# Patient Record
Sex: FEMALE | Race: OTHER | HISPANIC OR LATINO
[De-identification: names, ages, dates, MRNs, and addresses within clinical notes are randomized per-mention and may not be internally consistent; named-entity substitution may affect disease eponyms.]

---

## 2021-12-28 ENCOUNTER — RESULT REVIEW (OUTPATIENT)
Age: 86
End: 2021-12-28

## 2021-12-28 ENCOUNTER — APPOINTMENT (OUTPATIENT)
Dept: NEPHROLOGY | Facility: CLINIC | Age: 86
End: 2021-12-28
Payer: MEDICARE

## 2021-12-28 VITALS
SYSTOLIC BLOOD PRESSURE: 137 MMHG | HEART RATE: 44 BPM | DIASTOLIC BLOOD PRESSURE: 77 MMHG | RESPIRATION RATE: 16 BRPM | OXYGEN SATURATION: 99 %

## 2021-12-28 DIAGNOSIS — Z78.9 OTHER SPECIFIED HEALTH STATUS: ICD-10-CM

## 2021-12-28 DIAGNOSIS — Z85.528 PERSONAL HISTORY OF OTHER MALIGNANT NEOPLASM OF KIDNEY: ICD-10-CM

## 2021-12-28 DIAGNOSIS — Z84.1 FAMILY HISTORY OF DISORDERS OF KIDNEY AND URETER: ICD-10-CM

## 2021-12-28 DIAGNOSIS — Z90.5 ACQUIRED ABSENCE OF KIDNEY: ICD-10-CM

## 2021-12-28 DIAGNOSIS — I10 ESSENTIAL (PRIMARY) HYPERTENSION: ICD-10-CM

## 2021-12-28 DIAGNOSIS — N18.31 CHRONIC KIDNEY DISEASE, STAGE 3A: ICD-10-CM

## 2021-12-28 DIAGNOSIS — R73.03 PREDIABETES.: ICD-10-CM

## 2021-12-28 PROCEDURE — 99204 OFFICE O/P NEW MOD 45 MIN: CPT

## 2021-12-29 ENCOUNTER — OUTPATIENT (OUTPATIENT)
Dept: OUTPATIENT SERVICES | Facility: HOSPITAL | Age: 86
LOS: 1 days | End: 2021-12-29
Payer: MEDICARE

## 2021-12-29 ENCOUNTER — APPOINTMENT (OUTPATIENT)
Dept: ULTRASOUND IMAGING | Facility: HOSPITAL | Age: 86
End: 2021-12-29
Payer: MEDICARE

## 2021-12-29 PROCEDURE — 76770 US EXAM ABDO BACK WALL COMP: CPT

## 2021-12-29 PROCEDURE — 76770 US EXAM ABDO BACK WALL COMP: CPT | Mod: 26

## 2022-01-03 LAB
25(OH)D3 SERPL-MCNC: 26.4 NG/ML
ALBUMIN SERPL ELPH-MCNC: 4.3 G/DL
ANION GAP SERPL CALC-SCNC: 10 MMOL/L
APPEARANCE: CLEAR
BACTERIA: NEGATIVE
BILIRUBIN URINE: NEGATIVE
BLOOD URINE: NEGATIVE
BUN SERPL-MCNC: 22 MG/DL
CALCIUM SERPL-MCNC: 10.1 MG/DL
CALCIUM SERPL-MCNC: 10.1 MG/DL
CHLORIDE SERPL-SCNC: 104 MMOL/L
CO2 SERPL-SCNC: 30 MMOL/L
COLOR: NORMAL
CREAT SERPL-MCNC: 1.12 MG/DL
CREAT SPEC-SCNC: 56 MG/DL
CREAT SPEC-SCNC: 56 MG/DL
CREAT/PROT UR: 0.1 RATIO
CYSTATIN C SERPL-MCNC: 1.27 MG/L
ESTIMATED AVERAGE GLUCOSE: 128 MG/DL
GFR/BSA.PRED SERPLBLD CYS-BASED-ARV: 48 ML/MIN
GLUCOSE QUALITATIVE U: NEGATIVE
GLUCOSE SERPL-MCNC: 83 MG/DL
HBA1C MFR BLD HPLC: 6.1 %
HYALINE CASTS: 0 /LPF
KETONES URINE: NEGATIVE
LEUKOCYTE ESTERASE URINE: NEGATIVE
MAGNESIUM SERPL-MCNC: 2.1 MG/DL
MICROALBUMIN 24H UR DL<=1MG/L-MCNC: <1.2 MG/DL
MICROALBUMIN/CREAT 24H UR-RTO: NORMAL MG/G
MICROSCOPIC-UA: NORMAL
NITRITE URINE: NEGATIVE
PARATHYROID HORMONE INTACT: 101 PG/ML
PH URINE: 7
PHOSPHATE SERPL-MCNC: 3.6 MG/DL
POTASSIUM SERPL-SCNC: 5.3 MMOL/L
PROT UR-MCNC: 5 MG/DL
PROTEIN URINE: NEGATIVE
RED BLOOD CELLS URINE: 1 /HPF
SODIUM SERPL-SCNC: 144 MMOL/L
SPECIFIC GRAVITY URINE: 1.01
SQUAMOUS EPITHELIAL CELLS: 0 /HPF
UROBILINOGEN URINE: NORMAL
WHITE BLOOD CELLS URINE: 0 /HPF

## 2022-01-03 NOTE — HISTORY OF PRESENT ILLNESS
[FreeTextEntry1] : 87yo Stateless speaking female with longstanding HTN, solitary kidney s/p nephrectomy '98 for cancer referred for rising serum creatinine \par \par Dr Adebayo Rehman \par \par Accompanied by daughter\par \par She generally feels quite well. Blood pressure is typically well controlled. Was told of "mild kidney problems" a few years ago but told in the summer by her PCP that her eGFR dropped to high 40's. \par She denies NSAID use. Denies urinary hesitancy, hematuria or foamy urine. \par Unfortunately she didn't bring her med list, takes statin and 50mg metoprolol once daily, she thinks shes on a water pill (likely HCTZ), unsure of Losartan. \par No dizziness, headaches, weight loss. No recent changes to medications. \par \par OTC: baby ASA, Mg - will stop, uses for sleep. \par \par Fam Hx: several siblings with CKD

## 2022-01-03 NOTE — CONSULT LETTER
[Dear  ___] : Dear  [unfilled], [Consult Letter:] : I had the pleasure of evaluating your patient, [unfilled]. [Please see my note below.] : Please see my note below. [Consult Closing:] : Thank you very much for allowing me to participate in the care of this patient.  If you have any questions, please do not hesitate to contact me. [Sincerely,] : Sincerely, [FreeTextEntry3] : Shobha Burnett MD\par  of Medicine\par Division of Kidney Diseases and Hypertension\par [facility] \par [employer] [facility] Tel: [phone]\par Email: [email or web address] \par

## 2022-01-03 NOTE — PHYSICAL EXAM
[General Appearance - Alert] : alert [General Appearance - In No Acute Distress] : in no acute distress [Jugular Venous Distention Increased] : there was no jugular-venous distention [Respiration, Rhythm And Depth] : normal respiratory rhythm and effort [Auscultation Breath Sounds / Voice Sounds] : lungs were clear to auscultation bilaterally [Heart Sounds] : normal S1 and S2 [Edema] : there was no peripheral edema [Urinary Bladder Findings] : the bladder was normal on palpation [No CVA Tenderness] : no ~M costovertebral angle tenderness [Musculoskeletal - Swelling] : no joint swelling seen [] : no rash [No Focal Deficits] : no focal deficits [Oriented To Time, Place, And Person] : oriented to person, place, and time [FreeTextEntry1] : bradycardic

## 2022-01-03 NOTE — ASSESSMENT
[FreeTextEntry1] : 87yo Danish speaking female with longstanding HTN, solitary kidney s/p nephrectomy '98 for cancer referred for rising serum creatinine \par \par # CKD IIIa with solitary kidney\par - reviewed 2021 and 2020 labs with patient and daughter. Reassured that renal function is actually quite good considering her age and solitary kidney for >20yrs. She is unlikely to progress to ESRD. u/a bland. \par July 2021 Cr 1.07 egfr 47-55ml/min K 5 bic 31 A1C 5.8 normal albumin and calcium, no anemia\par 2020 Cr 0.97 u/a 1+ protein, repeat bland \par - discussed risk factors of CKD progression such as improving her preDM, maintaining BP control, avoiding NSAIDs. She was following strict no animal protein, no dairy diet because of her CKD, she can lift these restrictions and focus on low calorie, low sugar and low Na diet. \par - HTN well controlled, she will call back to confirm her medications \par \par Plan\par - check repeat u/a today with urine PCR And ACR, renal function including cystatin C based eGFR for better estimation of underlying renal function given her age and lower muscle mass\par - check Mg given she takes supplements but is also on HCTZ\par - screen for secondary hyperparathyroidism/CKD-MBD\par - check renal and bladder US with PVR\par - f/u in 6 months\par \par 1/3/22 addendum: d/w pt and daughter, stable creatinine with cystatin C based eGFR 44ml/min, bland u/a and echogenic R kidney all consistent with solitary kidney and her age. PTH mildly elevated however calcium wnl. Confirmed meds, metoprolol 25mg daily, HCTZ 25mg daily, rosuvastatin 20mg.

## 2022-01-20 ENCOUNTER — APPOINTMENT (OUTPATIENT)
Dept: HEART AND VASCULAR | Facility: CLINIC | Age: 87
End: 2022-01-20

## 2022-01-31 ENCOUNTER — APPOINTMENT (OUTPATIENT)
Dept: HEART AND VASCULAR | Facility: CLINIC | Age: 87
End: 2022-01-31
Payer: MEDICARE

## 2022-01-31 VITALS
SYSTOLIC BLOOD PRESSURE: 112 MMHG | DIASTOLIC BLOOD PRESSURE: 72 MMHG | HEART RATE: 51 BPM | WEIGHT: 160 LBS | BODY MASS INDEX: 29.44 KG/M2 | OXYGEN SATURATION: 96 % | HEIGHT: 62 IN | TEMPERATURE: 97.8 F

## 2022-01-31 DIAGNOSIS — R00.1 BRADYCARDIA, UNSPECIFIED: ICD-10-CM

## 2022-01-31 DIAGNOSIS — R53.83 OTHER FATIGUE: ICD-10-CM

## 2022-01-31 PROCEDURE — 36415 COLL VENOUS BLD VENIPUNCTURE: CPT

## 2022-01-31 PROCEDURE — 99205 OFFICE O/P NEW HI 60 MIN: CPT | Mod: 25

## 2022-01-31 PROCEDURE — 93000 ELECTROCARDIOGRAM COMPLETE: CPT

## 2022-01-31 NOTE — DISCUSSION/SUMMARY
[Patient] : the patient [___ Month(s)] : in [unfilled] month(s) [FreeTextEntry1] : 85 y/o female with h/o asthma, htn, predm, solitary kidney s/p nephrectomy 1998 for renal cell cancer who presents for initial evaluation today\par \par -labs 2021, 2022 reviewed - repeat labs today for fatigue\par -continue hctz\par -dc BB given chan\par -clarify prior issue w norvasc/combo pill\par -continue asa, statin\par -ordered echo and stress echo to evaluate HR response to exercise\par -order holter for chan/fatigue\par -renal f/up\par -ekg ordered today - SB 49, normal intervals, no st/t changes\par -counseled on cvd risk factors\par -f/up 3 weeks for fatigue, cvd risk factors, bradycardia, bp\par \par I have spent 60 minutes reviewing labs, records, tests and discussing cvd and bp management

## 2022-01-31 NOTE — PHYSICAL EXAM
[Well Developed] : well developed [Well Nourished] : well nourished [No Acute Distress] : no acute distress [Normal Conjunctiva] : normal conjunctiva [Normal Venous Pressure] : normal venous pressure [No Carotid Bruit] : no carotid bruit [Normal S1, S2] : normal S1, S2 [No Rub] : no rub [No Gallop] : no gallop [Clear Lung Fields] : clear lung fields [Good Air Entry] : good air entry [No Respiratory Distress] : no respiratory distress  [Soft] : abdomen soft [Non Tender] : non-tender [No Masses/organomegaly] : no masses/organomegaly [Normal Bowel Sounds] : normal bowel sounds [Normal Gait] : normal gait [No Edema] : no edema [No Cyanosis] : no cyanosis [No Clubbing] : no clubbing [No Varicosities] : no varicosities [No Rash] : no rash [No Skin Lesions] : no skin lesions [Moves all extremities] : moves all extremities [No Focal Deficits] : no focal deficits [Normal Speech] : normal speech [Alert and Oriented] : alert and oriented [Normal memory] : normal memory [de-identified] : 2/6 jaci tavares

## 2022-01-31 NOTE — HISTORY OF PRESENT ILLNESS
[FreeTextEntry1] : \par \par 87 y/o female with h/o asthma, htn, overweight, predm, solitary kidney s/p nephrectomy  for renal cell cancer who presents for initial evaluation today\par \par notes some fatigue\par renal recently decreased toprol from 50 to 25 last month\par states she was on norvasc/combo pill in past - did not tolerate - states had allergic reaction - lip swelling\par dc'd this 2 years ago\par \par no cp, sob, palpitations, lh, edema, orthopnea, pnd\par \par was seeing a cardiologist last year (Dr. Sascha Romo)\par \par seen by renal \par \par \par walks daily\par \par PMH/PSH:\par asthma\par htn\par solitary kidney s/p nephrectomy for renal cell cancer \par predm\par overweight\par \par \par ALL:\par motrin\par \par \par SH:\par no tobacco/etoh/drugs\[place] \[place]\par \par daughter - 62 - healthy\par live alone\par retired\par worked in [employer]\par \par \par \par FH:\par mother -  MI 91\par father -  hepatitis, 45\par siblings - (3  - cancer), 5 alive\par \par \par MEDS:\par asa 81 mg qd\par crestor 20 mg qhs\par hctz 25 mg qd\par metoprolol 25 mg qd\par

## 2022-02-02 LAB
ALBUMIN SERPL ELPH-MCNC: 4.3 G/DL
ALP BLD-CCNC: 70 U/L
ALT SERPL-CCNC: 11 U/L
ANION GAP SERPL CALC-SCNC: 14 MMOL/L
APPEARANCE: CLEAR
AST SERPL-CCNC: 17 U/L
BACTERIA: NEGATIVE
BASOPHILS # BLD AUTO: 0.02 K/UL
BASOPHILS NFR BLD AUTO: 0.3 %
BILIRUB SERPL-MCNC: 1 MG/DL
BILIRUBIN URINE: NEGATIVE
BLOOD URINE: NEGATIVE
BUN SERPL-MCNC: 18 MG/DL
CALCIUM SERPL-MCNC: 10.4 MG/DL
CHLORIDE SERPL-SCNC: 103 MMOL/L
CHOLEST SERPL-MCNC: 228 MG/DL
CO2 SERPL-SCNC: 25 MMOL/L
COLOR: NORMAL
CREAT SERPL-MCNC: 1.02 MG/DL
EOSINOPHIL # BLD AUTO: 0.07 K/UL
EOSINOPHIL NFR BLD AUTO: 1.1 %
ESTIMATED AVERAGE GLUCOSE: 126 MG/DL
FOLATE SERPL-MCNC: 16.5 NG/ML
GLUCOSE QUALITATIVE U: NEGATIVE
GLUCOSE SERPL-MCNC: 99 MG/DL
HBA1C MFR BLD HPLC: 6 %
HCT VFR BLD CALC: 41.9 %
HDLC SERPL-MCNC: 80 MG/DL
HGB BLD-MCNC: 13.7 G/DL
HYALINE CASTS: 0 /LPF
IMM GRANULOCYTES NFR BLD AUTO: 0.8 %
KETONES URINE: NEGATIVE
LDLC SERPL CALC-MCNC: 128 MG/DL
LEUKOCYTE ESTERASE URINE: NEGATIVE
LYMPHOCYTES # BLD AUTO: 1.78 K/UL
LYMPHOCYTES NFR BLD AUTO: 27.8 %
MAGNESIUM SERPL-MCNC: 1.9 MG/DL
MAN DIFF?: NORMAL
MCHC RBC-ENTMCNC: 28.4 PG
MCHC RBC-ENTMCNC: 32.7 GM/DL
MCV RBC AUTO: 86.7 FL
MICROSCOPIC-UA: NORMAL
MONOCYTES # BLD AUTO: 0.72 K/UL
MONOCYTES NFR BLD AUTO: 11.3 %
NEUTROPHILS # BLD AUTO: 3.76 K/UL
NEUTROPHILS NFR BLD AUTO: 58.7 %
NITRITE URINE: NEGATIVE
NONHDLC SERPL-MCNC: 148 MG/DL
PH URINE: 5.5
PLATELET # BLD AUTO: 231 K/UL
POTASSIUM SERPL-SCNC: 5.3 MMOL/L
PROT SERPL-MCNC: 7 G/DL
PROTEIN URINE: NEGATIVE
RBC # BLD: 4.83 M/UL
RBC # FLD: 14.6 %
RED BLOOD CELLS URINE: 1 /HPF
SODIUM SERPL-SCNC: 143 MMOL/L
SPECIFIC GRAVITY URINE: 1.01
SQUAMOUS EPITHELIAL CELLS: 0 /HPF
TRIGL SERPL-MCNC: 96 MG/DL
TSH SERPL-ACNC: 0.82 UIU/ML
UROBILINOGEN URINE: NORMAL
VIT B12 SERPL-MCNC: 1582 PG/ML
WBC # FLD AUTO: 6.4 K/UL
WHITE BLOOD CELLS URINE: 0 /HPF

## 2022-02-04 ENCOUNTER — APPOINTMENT (OUTPATIENT)
Dept: HEART AND VASCULAR | Facility: CLINIC | Age: 87
End: 2022-02-04

## 2022-02-04 DIAGNOSIS — E78.5 HYPERLIPIDEMIA, UNSPECIFIED: ICD-10-CM

## 2022-02-04 RX ORDER — ROSUVASTATIN CALCIUM 20 MG/1
20 TABLET, FILM COATED ORAL
Refills: 0 | Status: COMPLETED | COMMUNITY
End: 2022-02-04

## 2022-02-23 ENCOUNTER — APPOINTMENT (OUTPATIENT)
Dept: PULMONOLOGY | Facility: CLINIC | Age: 87
End: 2022-02-23
Payer: MEDICARE

## 2022-02-23 VITALS
SYSTOLIC BLOOD PRESSURE: 120 MMHG | HEART RATE: 60 BPM | BODY MASS INDEX: 29.44 KG/M2 | DIASTOLIC BLOOD PRESSURE: 86 MMHG | WEIGHT: 160 LBS | OXYGEN SATURATION: 97 % | HEIGHT: 62 IN | TEMPERATURE: 96.8 F

## 2022-02-23 PROCEDURE — 99204 OFFICE O/P NEW MOD 45 MIN: CPT | Mod: 25

## 2022-02-23 PROCEDURE — 71046 X-RAY EXAM CHEST 2 VIEWS: CPT

## 2022-02-24 ENCOUNTER — FORM ENCOUNTER (OUTPATIENT)
Age: 87
End: 2022-02-24

## 2022-02-24 ENCOUNTER — APPOINTMENT (OUTPATIENT)
Dept: PULMONOLOGY | Facility: CLINIC | Age: 87
End: 2022-02-24
Payer: MEDICARE

## 2022-02-24 LAB — SARS-COV-2 N GENE NPH QL NAA+PROBE: NOT DETECTED

## 2022-02-24 PROCEDURE — 94727 GAS DIL/WSHOT DETER LNG VOL: CPT

## 2022-02-24 PROCEDURE — 94060 EVALUATION OF WHEEZING: CPT

## 2022-02-24 PROCEDURE — 94729 DIFFUSING CAPACITY: CPT

## 2022-02-24 NOTE — ASSESSMENT
[FreeTextEntry1] : Data reviewed:\par \par PA/lat CXR in office 02/23/2022 : clear lungs, uncoiled aorta\par \par Impression:\par Dyspnea\par \par Plan:\par Really unusual to develop asthma in 80s.\par Will return for PFT. This may all just be normal aging.\par --\par PFT 2/24/22: wnl for age, FEV1 94%, no BD response, TLC 89%, DLCO 74% and within normal\par D/w PI [id number]. Rec stopping Trelegy. I believe she has no lung disease. If breathing worsens off Trelegy, return for repeat PFT off inhaler.

## 2022-02-24 NOTE — HISTORY OF PRESENT ILLNESS
[TextBox_4] : 02/23/2022: PI [id number]. Asked to evaluate patient by Dr Malone for asthma. She has no history of asthma as a young person, just given this dx about 3 years ago by pulm at [facility]. She does have allergies and sees an allergist (Dr Garrett) who prescribes her Trelegy. She uses this daily with little benefit. She is winded on just 2-3 blocks. She saw Dr Garrett last month who gave her prednisone 40x3, 20x3 with some benefit. Even harder to breathe w mask which she even wears outside. Vaxxed x 3. Never smoked. 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 No other hx lung disease. 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 Born

## 2022-02-25 ENCOUNTER — OUTPATIENT (OUTPATIENT)
Dept: OUTPATIENT SERVICES | Facility: HOSPITAL | Age: 87
LOS: 1 days | End: 2022-02-25
Payer: MEDICARE

## 2022-02-25 DIAGNOSIS — I10 ESSENTIAL (PRIMARY) HYPERTENSION: ICD-10-CM

## 2022-02-25 DIAGNOSIS — R53.83 OTHER FATIGUE: ICD-10-CM

## 2022-02-25 PROCEDURE — 93351 STRESS TTE COMPLETE: CPT | Mod: 26,52

## 2022-02-25 PROCEDURE — 93351 STRESS TTE COMPLETE: CPT

## 2022-03-03 ENCOUNTER — APPOINTMENT (OUTPATIENT)
Dept: HEART AND VASCULAR | Facility: CLINIC | Age: 87
End: 2022-03-03
Payer: MEDICARE

## 2022-03-03 VITALS
BODY MASS INDEX: 29.44 KG/M2 | DIASTOLIC BLOOD PRESSURE: 90 MMHG | WEIGHT: 160 LBS | OXYGEN SATURATION: 97 % | HEIGHT: 62 IN | SYSTOLIC BLOOD PRESSURE: 154 MMHG | HEART RATE: 64 BPM | TEMPERATURE: 97.4 F

## 2022-03-03 VITALS — HEART RATE: 73 BPM | DIASTOLIC BLOOD PRESSURE: 89 MMHG | SYSTOLIC BLOOD PRESSURE: 130 MMHG

## 2022-03-03 PROCEDURE — 99214 OFFICE O/P EST MOD 30 MIN: CPT

## 2022-03-03 PROCEDURE — 93306 TTE W/DOPPLER COMPLETE: CPT

## 2022-03-04 NOTE — DISCUSSION/SUMMARY
[Patient] : the patient [___ Month(s)] : in [unfilled] month(s) [FreeTextEntry1] : 87 y/o female with h/o asthma, htn, predm, solitary kidney s/p nephrectomy 1998 for renal cell cancer who presents for f/up today\par \par -stress/echo test 2/22: exercised 6 minutes to 90% mphr, no ekg changes, normal echo portion\par -Holter 2/22: 24 hours - SR, avg hr 87, (), 26 pvc, 105 pac, 1 run psvt 5 beats 115 bpm, no af, no pauses\par -labs 2021, 2022 reviewed  \par -continue hctz\par -dc BB\par -prior issue w norvasc/combo pill\par -continue asa, statin\par -echo ordered today - normal ef, trace mr/tr\par -renal f/up\par -ekg 1/22 - SB 49, normal intervals, no st/t changes\par -counseled on cvd risk factors\par -f/up 1 week for bp\par \par I have spent 30 minutes reviewing labs, records, tests and discussing cvd and bp management. \par

## 2022-03-04 NOTE — HISTORY OF PRESENT ILLNESS
[FreeTextEntry1] : 87 y/o female with h/o asthma, htn, overweight, predm, solitary kidney s/p nephrectomy  for renal cell cancer who presents for f/up today\par \par last seen  for fatigue\par BB dc'd for possible fatigue - however she resumed after she wore holter\par \par notes improved fatigue today\par \par -stress/echo test : exercised 6 minutes to 90% mphr, no ekg changes, normal echo portion\par -Holter : 24 hours - SR, avg hr 87, (), 26 pvc, 105 pac, 1 run psvt 5 beats 115 bpm, no af, no pauses\par \par seen by pulm \par  PFT 22: wnl for age, FEV1 94%, no BD response, TLC 89%, DLCO 74% and within normal\par   Rec stopping Trelegy. pulm believes she has no lung disease. If breathing worsens off Trelegy, return for repeat PFT off inhaler. \par \par states she was on norvasc/combo pill in past - did not tolerate - states had allergic reaction - lip swelling\par dc'd this 2 years ago\par \par no cp, sob, palpitations, lh, edema, orthopnea, pnd\par \par was seeing a cardiologist last year (Dr. Sascha Romo)\par \par seen by renal \par \par \par walks daily\par \par PMH/PSH:\par asthma\par htn\par solitary kidney s/p nephrectomy for renal cell cancer \par predm\par overweight\par \par \par ALL:\par motrin\par \par \par SH:\par no tobacco/etoh/drugs\par from \[place]\par \par daughter - 62 - healthy\par live alone\par retired\par worked in [employer]\par \par \par \par FH:\par mother -  MI 91\par father -  hepatitis, 45\par siblings - (3  - cancer), 5 alive\par \par \par MEDS:\par asa 81 mg qd\par crestor 40 mg qhs\par hctz 25 mg qd\par toprol 25 mg qd\par  \par \par

## 2022-04-07 ENCOUNTER — APPOINTMENT (OUTPATIENT)
Dept: HEART AND VASCULAR | Facility: CLINIC | Age: 87
End: 2022-04-07
Payer: MEDICARE

## 2022-04-07 VITALS
WEIGHT: 160 LBS | OXYGEN SATURATION: 96 % | TEMPERATURE: 97.3 F | SYSTOLIC BLOOD PRESSURE: 135 MMHG | HEIGHT: 63 IN | HEART RATE: 75 BPM | BODY MASS INDEX: 28.35 KG/M2 | DIASTOLIC BLOOD PRESSURE: 89 MMHG

## 2022-04-07 VITALS — DIASTOLIC BLOOD PRESSURE: 90 MMHG | SYSTOLIC BLOOD PRESSURE: 128 MMHG

## 2022-04-07 VITALS — DIASTOLIC BLOOD PRESSURE: 90 MMHG | SYSTOLIC BLOOD PRESSURE: 130 MMHG

## 2022-04-07 PROCEDURE — 99214 OFFICE O/P EST MOD 30 MIN: CPT

## 2022-04-07 RX ORDER — METOPROLOL SUCCINATE 25 MG/1
25 TABLET, EXTENDED RELEASE ORAL
Refills: 0 | Status: DISCONTINUED | COMMUNITY
End: 2022-04-07

## 2022-05-12 NOTE — DISCUSSION/SUMMARY
[Patient] : the patient [___ Month(s)] : in [unfilled] month(s) [FreeTextEntry1] : 87 y/o female with h/o asthma, htn, predm, solitary kidney s/p nephrectomy 1998 for renal cell cancer who presents for f/up today\par \par -stress/echo test 2/22: exercised 6 minutes to 90% mphr, no ekg changes, normal echo portion\par -Holter 2/22: 24 hours - SR, avg hr 87, (), 26 pvc, 105 pac, 1 run psvt 5 beats 115 bpm, no af, no pauses\par -labs 2021, 2022 reviewed \par -continue hctz (off BB and prior issue w norvasc/combo pill causing facial swelling) - patient to confirm name/dose of medication associated with facial swelling 2019 - pending that information can decide on alternate drug for bp (can consider half dose BB toprol 12.5)\par -continue asa, statin\par -Echo 3/22 - normal ef, trace mr/tr\par -renal f/up\par -ekg 1/22 - SB 49, normal intervals, no st/t changes\par -counseled on cvd risk factors\par -f/up 4 months for htn\par \par I have spent 30 minutes reviewing labs, records, tests and discussing cvd and bp management.

## 2022-05-12 NOTE — HISTORY OF PRESENT ILLNESS
[FreeTextEntry1] : 85 y/o female with h/o asthma, htn, overweight, predm, solitary kidney s/p nephrectomy  for renal cell cancer who presents for f/up today\par \par last seen 3/22  \par Echo 3/22: normal ef, trace mr/tr\par BB dc'd last visit\par  \par no cp, sob, palpitations, lh, edema, orthopnea, pnd\par \par -stress/echo test : exercised 6 minutes to 90% mphr, no ekg changes, normal echo portion\par -Holter : 24 hours - SR, avg hr 87, (), 26 pvc, 105 pac, 1 run psvt 5 beats 115 bpm, no af, no pauses\par \par seen by pulm \par  PFT 22: wnl for age, FEV1 94%, no BD response, TLC 89%, DLCO 74% and within normal\par  Rec stopping Trelegy. pulm believes she has no lung disease. If breathing worsens off Trelegy, return for repeat PFT off inhaler. \par \par states she was on norvasc/benazepril 10/ pill in past - did not tolerate - states had allergic reaction - lip swelling\par dc'd this 2 years ago\par also h/o reaction to verapamil\par \par  \par \par seen by renal \par \par \par walks daily\par \par PMH/PSH:\par asthma\par htn\par solitary kidney s/p nephrectomy for renal cell cancer \par predm\par overweight\par \par \par ALL:\par motrin\par \par \par SH:\par no tobacco/etoh/drugs\par [place]\par \par daughter - 62 - healthy\par live alone\par retired\par worked in [employer]\par \par \par \par FH:\par mother -  MI 91\par father -  hepatitis, 45\par siblings - (3  - cancer), 5 alive\par \par \par MEDS:\par asa 81 mg qd\par crestor 40 mg qhs\par hctz 25 mg qd\par  \par

## 2022-05-25 ENCOUNTER — NON-APPOINTMENT (OUTPATIENT)
Age: 87
End: 2022-05-25

## 2022-05-25 ENCOUNTER — APPOINTMENT (OUTPATIENT)
Dept: HEART AND VASCULAR | Facility: CLINIC | Age: 87
End: 2022-05-25
Payer: MEDICARE

## 2022-05-25 VITALS
WEIGHT: 160 LBS | SYSTOLIC BLOOD PRESSURE: 126 MMHG | BODY MASS INDEX: 28.35 KG/M2 | DIASTOLIC BLOOD PRESSURE: 81 MMHG | HEART RATE: 47 BPM | HEIGHT: 63 IN

## 2022-05-25 PROCEDURE — 99212 OFFICE O/P EST SF 10 MIN: CPT

## 2022-09-19 ENCOUNTER — RX RENEWAL (OUTPATIENT)
Age: 87
End: 2022-09-19

## 2023-05-04 ENCOUNTER — APPOINTMENT (OUTPATIENT)
Dept: HEART AND VASCULAR | Facility: CLINIC | Age: 88
End: 2023-05-04
Payer: MEDICARE

## 2023-05-04 ENCOUNTER — NON-APPOINTMENT (OUTPATIENT)
Age: 88
End: 2023-05-04

## 2023-05-04 VITALS
WEIGHT: 164 LBS | BODY MASS INDEX: 29.06 KG/M2 | HEART RATE: 61 BPM | OXYGEN SATURATION: 95 % | HEIGHT: 63 IN | TEMPERATURE: 97.4 F | DIASTOLIC BLOOD PRESSURE: 77 MMHG | SYSTOLIC BLOOD PRESSURE: 121 MMHG

## 2023-05-04 PROCEDURE — 36415 COLL VENOUS BLD VENIPUNCTURE: CPT

## 2023-05-04 PROCEDURE — 93000 ELECTROCARDIOGRAM COMPLETE: CPT

## 2023-05-04 PROCEDURE — 99214 OFFICE O/P EST MOD 30 MIN: CPT | Mod: 25

## 2023-05-04 NOTE — DISCUSSION/SUMMARY
[Patient] : the patient [___ Month(s)] : in [unfilled] month(s) [EKG obtained to assist in diagnosis and management of assessed problem(s)] : EKG obtained to assist in diagnosis and management of assessed problem(s) [FreeTextEntry1] : 86 y/o female with h/o asthma, htn, predm, solitary kidney s/p nephrectomy 1998 for renal cell cancer who presents for f/up today\par \par -stress/echo test 2/22: exercised 6 minutes to 90% mphr, no ekg changes, normal echo portion\par -Holter 2/22: 24 hours - SR, avg hr 87, (), 26 pvc, 105 pac, 1 run psvt 5 beats 115 bpm, no af, no pauses\par -labs 2021, 2022 reviewed, labs ordered today \par -continue hctz, coreg\par -continue asa, statin\par -Echo 3/22 - normal ef, trace mr/tr\par -renal f/up\par -ekg ordered today: SB, normal intervals, no st/t changes\par -counseled on cvd risk factors\par -f/up 4 months for htn\par \par I have spent 30 minutes reviewing labs, records, tests and discussing cvd and bp management.

## 2023-05-04 NOTE — HISTORY OF PRESENT ILLNESS
[FreeTextEntry1] : 88 y/o female with h/o asthma, htn, overweight, predm, solitary kidney s/p nephrectomy  for renal cell cancer who presents for f/up today\par \par last seen  \par \par Echo 3/22: normal ef, trace mr/tr\par  \par  \par no cp, sob, palpitations, lh, edema, orthopnea, pnd\par \par -stress/echo test : exercised 6 minutes to 90% mphr, no ekg changes, normal echo portion\par -Holter : 24 hours - SR, avg hr 87, (), 26 pvc, 105 pac, 1 run psvt 5 beats 115 bpm, no af, no pauses\par \par seen by pulm \par  PFT 22: wnl for age, FEV1 94%, no BD response, TLC 89%, DLCO 74% and within normal\par  Rec stopping Trelegy. pulm believes she has no lung disease. If breathing worsens off Trelegy, return for repeat PFT off inhaler. \par \par states she was on norvasc/benazepril 10/20 pill in past - did not tolerate - states had allergic reaction - lip swelling\par dc'd this 2 years ago\par also h/o reaction to verapamil\par \par  \par \par seen by renal \par \par \par walks daily\par \par PMH/PSH:\par asthma\par htn\par solitary kidney s/p nephrectomy for renal cell cancer \par predm\par overweight\par left cataract surgery \par \par \par ALL:\par motrin\par \par \par SH:\par no tobacco/etoh/drugs\[place]\par \par daughter - 60's - healthy\par live alone\par retired\par worked in [employer]\par \par \par \par FH:\par mother -  MI 91\par father -  hepatitis, 45\par siblings - (3  - cancer), 5 alive\par \par \par MEDS:\par asa 81 mg qd\par crestor 40 mg qhs\par hctz 25 mg qd\par coreg 6.25 mg bid

## 2023-05-08 LAB
ALBUMIN SERPL ELPH-MCNC: 4.1 G/DL
ALP BLD-CCNC: 65 U/L
ALT SERPL-CCNC: 11 U/L
ANION GAP SERPL CALC-SCNC: 9 MMOL/L
APPEARANCE: CLEAR
AST SERPL-CCNC: 17 U/L
BACTERIA: NEGATIVE /HPF
BASOPHILS # BLD AUTO: 0.02 K/UL
BASOPHILS NFR BLD AUTO: 0.5 %
BILIRUB SERPL-MCNC: 0.8 MG/DL
BILIRUBIN URINE: NEGATIVE
BLOOD URINE: NEGATIVE
BUN SERPL-MCNC: 17 MG/DL
CALCIUM SERPL-MCNC: 9.7 MG/DL
CAST: 1 /LPF
CHLORIDE SERPL-SCNC: 105 MMOL/L
CHOLEST SERPL-MCNC: 216 MG/DL
CO2 SERPL-SCNC: 29 MMOL/L
COLOR: YELLOW
CREAT SERPL-MCNC: 1.02 MG/DL
CREAT SPEC-SCNC: 55 MG/DL
EGFR: 53 ML/MIN/1.73M2
EOSINOPHIL # BLD AUTO: 0.07 K/UL
EOSINOPHIL NFR BLD AUTO: 1.6 %
EPITHELIAL CELLS: 0 /HPF
ESTIMATED AVERAGE GLUCOSE: 131 MG/DL
GLUCOSE QUALITATIVE U: NEGATIVE MG/DL
GLUCOSE SERPL-MCNC: 104 MG/DL
HBA1C MFR BLD HPLC: 6.2 %
HCT VFR BLD CALC: 38 %
HDLC SERPL-MCNC: 72 MG/DL
HGB BLD-MCNC: 12.4 G/DL
IMM GRANULOCYTES NFR BLD AUTO: 0.2 %
KETONES URINE: NEGATIVE MG/DL
LDLC SERPL CALC-MCNC: 115 MG/DL
LEUKOCYTE ESTERASE URINE: NEGATIVE
LYMPHOCYTES # BLD AUTO: 1.5 K/UL
LYMPHOCYTES NFR BLD AUTO: 34.7 %
MAGNESIUM SERPL-MCNC: 2 MG/DL
MAN DIFF?: NORMAL
MCHC RBC-ENTMCNC: 28.1 PG
MCHC RBC-ENTMCNC: 32.6 GM/DL
MCV RBC AUTO: 86 FL
MICROALBUMIN 24H UR DL<=1MG/L-MCNC: <1.2 MG/DL
MICROALBUMIN/CREAT 24H UR-RTO: NORMAL MG/G
MICROSCOPIC-UA: NORMAL
MONOCYTES # BLD AUTO: 0.34 K/UL
MONOCYTES NFR BLD AUTO: 7.9 %
NEUTROPHILS # BLD AUTO: 2.38 K/UL
NEUTROPHILS NFR BLD AUTO: 55.1 %
NITRITE URINE: NEGATIVE
NONHDLC SERPL-MCNC: 144 MG/DL
PH URINE: 7.5
PLATELET # BLD AUTO: 189 K/UL
POTASSIUM SERPL-SCNC: 4.7 MMOL/L
PROT SERPL-MCNC: 7 G/DL
PROTEIN URINE: NEGATIVE MG/DL
RBC # BLD: 4.42 M/UL
RBC # FLD: 14.6 %
RED BLOOD CELLS URINE: 0 /HPF
SODIUM SERPL-SCNC: 144 MMOL/L
SPECIFIC GRAVITY URINE: 1.01
TRIGL SERPL-MCNC: 143 MG/DL
TSH SERPL-ACNC: 1.1 UIU/ML
UROBILINOGEN URINE: 0.2 MG/DL
WBC # FLD AUTO: 4.32 K/UL
WHITE BLOOD CELLS URINE: 0 /HPF

## 2023-08-28 ENCOUNTER — NON-APPOINTMENT (OUTPATIENT)
Age: 88
End: 2023-08-28

## 2023-08-28 ENCOUNTER — APPOINTMENT (OUTPATIENT)
Dept: HEART AND VASCULAR | Facility: CLINIC | Age: 88
End: 2023-08-28
Payer: MEDICARE

## 2023-08-28 VITALS — DIASTOLIC BLOOD PRESSURE: 82 MMHG | SYSTOLIC BLOOD PRESSURE: 130 MMHG

## 2023-08-28 VITALS
HEIGHT: 63 IN | BODY MASS INDEX: 29.06 KG/M2 | SYSTOLIC BLOOD PRESSURE: 144 MMHG | WEIGHT: 164 LBS | TEMPERATURE: 97.7 F | DIASTOLIC BLOOD PRESSURE: 87 MMHG | HEART RATE: 61 BPM | OXYGEN SATURATION: 98 %

## 2023-08-28 PROCEDURE — 93000 ELECTROCARDIOGRAM COMPLETE: CPT

## 2023-08-28 PROCEDURE — 99214 OFFICE O/P EST MOD 30 MIN: CPT | Mod: 25

## 2023-08-28 NOTE — DISCUSSION/SUMMARY
[Patient] : the patient [___ Month(s)] : in [unfilled] month(s) [FreeTextEntry1] : 89 y/o female with h/o asthma, htn, predm, solitary kidney s/p nephrectomy 1998 for renal cell cancer who presents for f/up today  -ordered ekg today - SB, normal intervals, no st/t changes  -ordered nuclear stress test and echo for posey  -stress/echo test 2/22: exercised 6 minutes to 90% mphr, no ekg changes, normal echo portion  -Holter 2/22: 24 hours - SR, avg hr 87, (), 26 pvc, 105 pac, 1 run psvt 5 beats 115 bpm, no af, no pauses  -labs 2023 reviewed  -continue hctz, coreg  -continue asa, statin  -Echo 3/22 - normal ef, trace mr/tr  -renal f/up  -ekg 5/23: SB, normal intervals, no st/t changes  -counseled on cvd risk factors  -f/up 6 months for htn, posey    I have spent 30 minutes reviewing labs, records, tests and discussing cvd and bp management.

## 2023-08-28 NOTE — HISTORY OF PRESENT ILLNESS
[FreeTextEntry1] : 89 y/o female with h/o asthma, htn, overweight, predm, solitary kidney s/p nephrectomy  for renal cell cancer who presents for f/up today    last seen   notes a few weeks ago had some episodes of sob w exercise  Echo 3/22: normal ef, trace mr/tr   no cp, palpitations, lh, edema, orthopnea, pnd    -stress/echo test : exercised 6 minutes to 90% mphr, no ekg changes, normal echo portion  -Holter : 24 hours - SR, avg hr 87, (), 26 pvc, 105 pac, 1 run psvt 5 beats 115 bpm, no af, no pauses    seen by pulm  PFT 22: wnl for age, FEV1 94%, no BD response, TLC 89%, DLCO 74% and within normal  Rec stopping Trelegy. pulm believes she has no lung disease. If breathing worsens off Trelegy, return for repeat PFT off inhaler.    states she was on norvasc/benazepril 10/20 pill in past - did not tolerate - states had allergic reaction - lip swelling  dc'd this 2 years ago  also h/o reaction to verapamil        seen by renal       walks daily    PMH/PSH:  asthma  htn  solitary kidney s/p nephrectomy for renal cell cancer   predm  overweight  left cataract surgery       ALL:  motrin      SH:  no tobacco/etoh/drugs  from   lived [place] years    daughter - 60's - healthy  live alone  retired  worked in [employer]        FH:  mother -  MI 91  father -  hepatitis, 45  siblings - (3  - cancer), 5 alive      MEDS:  asa 81 mg qd  crestor 40 mg qhs  hctz 25 mg qd  coreg 6.25 mg bid

## 2023-09-05 ENCOUNTER — OUTPATIENT (OUTPATIENT)
Dept: OUTPATIENT SERVICES | Facility: HOSPITAL | Age: 88
LOS: 1 days | End: 2023-09-05
Payer: MEDICARE

## 2023-09-05 ENCOUNTER — RESULT REVIEW (OUTPATIENT)
Age: 88
End: 2023-09-05

## 2023-09-05 DIAGNOSIS — R06.09 OTHER FORMS OF DYSPNEA: ICD-10-CM

## 2023-09-05 PROCEDURE — 78452 HT MUSCLE IMAGE SPECT MULT: CPT

## 2023-09-05 PROCEDURE — 93016 CV STRESS TEST SUPVJ ONLY: CPT

## 2023-09-05 PROCEDURE — A9500: CPT

## 2023-09-05 PROCEDURE — 93017 CV STRESS TEST TRACING ONLY: CPT

## 2023-09-05 PROCEDURE — 93018 CV STRESS TEST I&R ONLY: CPT

## 2023-09-05 PROCEDURE — 78452 HT MUSCLE IMAGE SPECT MULT: CPT | Mod: 26

## 2023-09-15 ENCOUNTER — APPOINTMENT (OUTPATIENT)
Dept: HEART AND VASCULAR | Facility: CLINIC | Age: 88
End: 2023-09-15
Payer: MEDICARE

## 2023-09-15 VITALS
SYSTOLIC BLOOD PRESSURE: 134 MMHG | HEART RATE: 46 BPM | DIASTOLIC BLOOD PRESSURE: 70 MMHG | BODY MASS INDEX: 29.06 KG/M2 | WEIGHT: 164 LBS | OXYGEN SATURATION: 96 % | HEIGHT: 63 IN

## 2023-09-15 DIAGNOSIS — R06.09 OTHER FORMS OF DYSPNEA: ICD-10-CM

## 2023-09-15 PROCEDURE — 93306 TTE W/DOPPLER COMPLETE: CPT

## 2023-10-17 ENCOUNTER — APPOINTMENT (OUTPATIENT)
Dept: HEART AND VASCULAR | Facility: CLINIC | Age: 88
End: 2023-10-17
Payer: MEDICARE

## 2023-10-17 PROCEDURE — 99443: CPT

## 2024-05-28 ENCOUNTER — APPOINTMENT (OUTPATIENT)
Dept: HEART AND VASCULAR | Facility: CLINIC | Age: 89
End: 2024-05-28
Payer: MEDICARE

## 2024-05-28 VITALS
OXYGEN SATURATION: 96 % | TEMPERATURE: 97.2 F | DIASTOLIC BLOOD PRESSURE: 83 MMHG | HEART RATE: 50 BPM | BODY MASS INDEX: 28.7 KG/M2 | SYSTOLIC BLOOD PRESSURE: 151 MMHG | HEIGHT: 63 IN | WEIGHT: 162 LBS

## 2024-05-28 VITALS — DIASTOLIC BLOOD PRESSURE: 72 MMHG | SYSTOLIC BLOOD PRESSURE: 131 MMHG

## 2024-05-28 PROCEDURE — 99214 OFFICE O/P EST MOD 30 MIN: CPT | Mod: 25

## 2024-05-28 PROCEDURE — 36415 COLL VENOUS BLD VENIPUNCTURE: CPT

## 2024-05-28 PROCEDURE — G2211 COMPLEX E/M VISIT ADD ON: CPT | Mod: NC

## 2024-05-28 PROCEDURE — 93000 ELECTROCARDIOGRAM COMPLETE: CPT

## 2024-05-28 RX ORDER — ROSUVASTATIN CALCIUM 40 MG/1
40 TABLET, FILM COATED ORAL
Qty: 90 | Refills: 2 | Status: ACTIVE | COMMUNITY
Start: 2022-02-04 | End: 1900-01-01

## 2024-05-28 RX ORDER — ASPIRIN ENTERIC COATED TABLETS 81 MG 81 MG/1
81 TABLET, DELAYED RELEASE ORAL DAILY
Qty: 90 | Refills: 3 | Status: ACTIVE | COMMUNITY
Start: 1900-01-01 | End: 1900-01-01

## 2024-05-28 RX ORDER — HYDROCHLOROTHIAZIDE 25 MG/1
25 TABLET ORAL DAILY
Qty: 90 | Refills: 3 | Status: ACTIVE | COMMUNITY
Start: 1900-01-01 | End: 1900-01-01

## 2024-05-28 RX ORDER — CARVEDILOL 6.25 MG/1
6.25 TABLET, FILM COATED ORAL TWICE DAILY
Qty: 180 | Refills: 1 | Status: ACTIVE | COMMUNITY
Start: 2022-05-10 | End: 1900-01-01

## 2024-05-28 NOTE — DISCUSSION/SUMMARY
[Patient] : the patient [___ Month(s)] : in [unfilled] month(s) [FreeTextEntry1] : 89 y/o female with h/o asthma, htn, predm, solitary kidney s/p nephrectomy 1998 for renal cell cancer who presents for f/up today     -ekg ordered today - SB, normal intervals, no st/t changes  -Nuclear Stress Test 9/23: normal  -Echo 9/23: 1. Left ventricular wall thickness is mildly increased. Left ventricular systolic function is normal. 2. Right ventricular cavity is normal in size, normal wall thickness and normal systolic function. 3. Mild mitral regurgitation. 4. No pericardial effusion seen.   -stress/echo test 2/22: exercised 6 minutes to 90% mphr, no ekg changes, normal echo portion  -Holter 2/22: 24 hours - SR, avg hr 87, (), 26 pvc, 105 pac, 1 run psvt 5 beats 115 bpm, no af, no pauses  -labs 2023 reviewed, labs ordered today  -continue hctz, coreg  -continue asa, statin  -Echo 3/22 - normal ef, trace mr/tr  -renal f/up  -counseled on cvd risk factors  -pulm f/up - on breo - pft w mild defect w bronchodilator response  -f/up 6 months for htn     I have spent 30 minutes reviewing labs, records, tests and discussing cvd and bp management [EKG obtained to assist in diagnosis and management of assessed problem(s)] : EKG obtained to assist in diagnosis and management of assessed problem(s)

## 2024-05-28 NOTE — HISTORY OF PRESENT ILLNESS
[FreeTextEntry1] : 87 y/o female with h/o asthma, htn, overweight, predm, solitary kidney s/p nephrectomy  for renal cell cancer who presents for f/up today     last seen 10/23 via telephone notes sob improved now on pulm treatment no further sob no cp, palpitations, lh, edema, orthopnea, pnd  -Nuclear Stress Test : normal  -Echo : 1. Left ventricular wall thickness is mildly increased. Left ventricular systolic function is normal. 2. Right ventricular cavity is normal in size, normal wall thickness and normal systolic function. 3. Mild mitral regurgitation. 4. No pericardial effusion seen.   seen by pulm  at [facility] chest ct done - pft showed mild defect w bronchodilator response - recommended trelegy     Echo 3/22: normal ef, trace mr/tr  -stress/echo test : exercised 6 minutes to 90% mphr, no ekg changes, normal echo portion  -Holter : 24 hours - SR, avg hr 87, (), 26 pvc, 105 pac, 1 run psvt 5 beats 115 bpm, no af, no pauses    seen by pulm  PFT 22: wnl for age, FEV1 94%, no BD response, TLC 89%, DLCO 74% and within normal   states she was on norvasc/benazepril 10/20 pill in past - did not tolerate - states had allergic reaction - lip swelling  dc'd this 2 years ago  also h/o reaction to verapamil        seen by renal       walks daily    PMH/PSH:  asthma  htn  solitary kidney s/p nephrectomy for renal cell cancer   predm  overweight  left cataract surgery       ALL:  motrin      SH:  no tobacco/etoh/drugs  from [place]  [place]    daughter - 60's - healthy  live alone  retired  worked in [employer]        FH:  mother -  MI 91  father -  hepatitis, 45  siblings - (3  - cancer), 5 alive      MEDS:  asa 81 mg qd  crestor 40 mg qhs  hctz 25 mg qd  coreg 6.25 mg bid  breo

## 2024-05-29 LAB
ALBUMIN SERPL ELPH-MCNC: 4.1 G/DL
ALP BLD-CCNC: 70 U/L
ALT SERPL-CCNC: 13 U/L
ANION GAP SERPL CALC-SCNC: 12 MMOL/L
AST SERPL-CCNC: 18 U/L
BASOPHILS # BLD AUTO: 0.02 K/UL
BASOPHILS NFR BLD AUTO: 0.4 %
BILIRUB SERPL-MCNC: 1 MG/DL
BUN SERPL-MCNC: 18 MG/DL
CALCIUM SERPL-MCNC: 9.6 MG/DL
CHLORIDE SERPL-SCNC: 107 MMOL/L
CHOLEST SERPL-MCNC: 166 MG/DL
CO2 SERPL-SCNC: 26 MMOL/L
CREAT SERPL-MCNC: 1.07 MG/DL
CREAT SPEC-SCNC: 68 MG/DL
EGFR: 50 ML/MIN/1.73M2
EOSINOPHIL # BLD AUTO: 0.06 K/UL
EOSINOPHIL NFR BLD AUTO: 1.3 %
ESTIMATED AVERAGE GLUCOSE: 126 MG/DL
GLUCOSE SERPL-MCNC: 100 MG/DL
HBA1C MFR BLD HPLC: 6 %
HCT VFR BLD CALC: 38.6 %
HDLC SERPL-MCNC: 70 MG/DL
HGB BLD-MCNC: 12.3 G/DL
IMM GRANULOCYTES NFR BLD AUTO: 0.4 %
LDLC SERPL CALC-MCNC: 72 MG/DL
LYMPHOCYTES # BLD AUTO: 1.06 K/UL
LYMPHOCYTES NFR BLD AUTO: 23.8 %
MAGNESIUM SERPL-MCNC: 1.9 MG/DL
MAN DIFF?: NORMAL
MCHC RBC-ENTMCNC: 28.1 PG
MCHC RBC-ENTMCNC: 31.9 GM/DL
MCV RBC AUTO: 88.1 FL
MICROALBUMIN 24H UR DL<=1MG/L-MCNC: 2.1 MG/DL
MICROALBUMIN/CREAT 24H UR-RTO: 31 MG/G
MONOCYTES # BLD AUTO: 0.46 K/UL
MONOCYTES NFR BLD AUTO: 10.3 %
NEUTROPHILS # BLD AUTO: 2.84 K/UL
NEUTROPHILS NFR BLD AUTO: 63.8 %
NONHDLC SERPL-MCNC: 96 MG/DL
PLATELET # BLD AUTO: 162 K/UL
POTASSIUM SERPL-SCNC: 4.6 MMOL/L
PROT SERPL-MCNC: 6.5 G/DL
RBC # BLD: 4.38 M/UL
RBC # FLD: 14.7 %
SODIUM SERPL-SCNC: 145 MMOL/L
TRIGL SERPL-MCNC: 141 MG/DL
TSH SERPL-ACNC: 1.38 UIU/ML
WBC # FLD AUTO: 4.46 K/UL

## 2024-08-19 ENCOUNTER — APPOINTMENT (OUTPATIENT)
Dept: NEUROLOGY | Age: 89
End: 2024-08-19
Payer: MEDICARE

## 2024-08-19 VITALS
SYSTOLIC BLOOD PRESSURE: 104 MMHG | BODY MASS INDEX: 28.7 KG/M2 | RESPIRATION RATE: 17 BRPM | TEMPERATURE: 97.5 F | OXYGEN SATURATION: 96 % | HEART RATE: 62 BPM | DIASTOLIC BLOOD PRESSURE: 77 MMHG | WEIGHT: 162 LBS | HEIGHT: 63 IN

## 2024-08-19 DIAGNOSIS — H81.90 UNSPECIFIED DISORDER OF VESTIBULAR FUNCTION, UNSPECIFIED EAR: ICD-10-CM

## 2024-08-19 DIAGNOSIS — R42 DIZZINESS AND GIDDINESS: ICD-10-CM

## 2024-08-19 PROCEDURE — 99204 OFFICE O/P NEW MOD 45 MIN: CPT

## 2024-08-19 RX ORDER — FLUTICASONE FUROATE, UMECLIDINIUM BROMIDE AND VILANTEROL TRIFENATATE 100; 62.5; 25 UG/1; UG/1; UG/1
100-62.5-25 POWDER RESPIRATORY (INHALATION) DAILY
Refills: 0 | Status: ACTIVE | COMMUNITY

## 2024-08-19 RX ORDER — ALBUTEROL 90 MCG
90 AEROSOL (GRAM) INHALATION
Refills: 0 | Status: ACTIVE | COMMUNITY

## 2024-08-19 NOTE — HISTORY OF PRESENT ILLNESS
[FreeTextEntry1] : YOJANA DOVE is a 89 year who presents with dizziness  3 months ago had acute dizziness. lasted 3 days and self resolved. feeling of spinning and listing to R when walking.  symptoms present at rest and worse with movement.  no associated symptoms, such as headache, ear fullness, tinnitus, hearing loss. no recurrence since. feels well now.   Denies diplopia, blurred vision, dysphagia, dysarthria, aphasia, focal weakness or numbness, bowel or bladder dysfunction, falls, headaches.

## 2024-08-19 NOTE — PHYSICAL EXAM
[FreeTextEntry1] : General: this is a pleasant patient in no acute distress  HEENT conjunctiva are normal, no tenderness in head  CV: normal pulses, regular rate and rhythm, no peripheral edema noted  Lungs: breathing is non-labored  abd: soft and non-distended  MSK: SLR:  [place]: range of motion: tinnels:  spurling: Occipital nerve tenderness:  Mental status: Alert and oriented to person, place and time, normal speech and comprehension  Cranial Nerves: extra-occular movements in tact without nystagmus, normal saccades and smooth pursuit, Face symmetric and facial strength symmetric, facial sensation symmetric, head thrust normal  Motor: normal bulk and tone throughout. no abnormal movements.  Full 5/5 strength uppers and lower extremities proximally and distally  Sensory: in tact and symmetric to vibration, light tough, temperature  Cerebellar: normal finger-nose-finger bilaterally  Reflexes: 2+ in the upper and lower extremities and symmetric.  toes are bilaterally downgoing.  Gait: stable, able to tip toe heel and tandem  Stances: Romberg: normal

## 2024-09-04 ENCOUNTER — OUTPATIENT (OUTPATIENT)
Dept: OUTPATIENT SERVICES | Facility: HOSPITAL | Age: 89
LOS: 1 days | End: 2024-09-04
Payer: MEDICARE

## 2024-09-04 ENCOUNTER — APPOINTMENT (OUTPATIENT)
Dept: MRI IMAGING | Facility: HOSPITAL | Age: 89
End: 2024-09-04

## 2024-09-04 PROCEDURE — 70551 MRI BRAIN STEM W/O DYE: CPT

## 2024-09-04 PROCEDURE — 70547 MR ANGIOGRAPHY NECK W/O DYE: CPT

## 2024-09-04 PROCEDURE — 70551 MRI BRAIN STEM W/O DYE: CPT | Mod: 26

## 2024-09-04 PROCEDURE — 70547 MR ANGIOGRAPHY NECK W/O DYE: CPT | Mod: 26

## 2024-09-05 DIAGNOSIS — I63.9 CEREBRAL INFARCTION, UNSPECIFIED: ICD-10-CM

## 2024-10-01 ENCOUNTER — APPOINTMENT (OUTPATIENT)
Dept: HEART AND VASCULAR | Facility: CLINIC | Age: 89
End: 2024-10-01
Payer: MEDICARE

## 2024-10-01 ENCOUNTER — NON-APPOINTMENT (OUTPATIENT)
Age: 89
End: 2024-10-01

## 2024-10-01 VITALS
BODY MASS INDEX: 29.06 KG/M2 | OXYGEN SATURATION: 95 % | HEART RATE: 51 BPM | HEIGHT: 63 IN | SYSTOLIC BLOOD PRESSURE: 110 MMHG | DIASTOLIC BLOOD PRESSURE: 70 MMHG | TEMPERATURE: 97.1 F | WEIGHT: 164 LBS

## 2024-10-01 DIAGNOSIS — R07.9 CHEST PAIN, UNSPECIFIED: ICD-10-CM

## 2024-10-01 PROCEDURE — 93000 ELECTROCARDIOGRAM COMPLETE: CPT

## 2024-10-01 PROCEDURE — 99214 OFFICE O/P EST MOD 30 MIN: CPT | Mod: 25

## 2024-10-01 NOTE — HISTORY OF PRESENT ILLNESS
[FreeTextEntry1] : 89 y/o female with h/o asthma, htn, cva, overweight, predm, solitary kidney s/p nephrectomy  for renal cell cancer who presents for f/up today    last seen   seen by neuro  w dizziness  MRA head/neck: : 1. RIGHT CAROTID CIRCULATION:  Intact. 2. LEFT CAROTID CIRCULATION:  Intact. 3. VERTEBRAL CIRCULATION:  Intact 4. BRAIN:   Multiple small remote infarctions within the cerebral hemispheric white matter and basal ganglia. No acute infarction. Ischemic white matter disease upper range typical for age. Diffuse brain volume loss lower range typical for age    notes some new episodes cp in july - has had similar in past few years, pressure lasted 5 years, radiated to back no sob, palpitations, lh, edema, orthopnea, pnd  -Nuclear Stress Test : normal  -Echo : 1. Left ventricular wall thickness is mildly increased. Left ventricular systolic function is normal. 2. Right ventricular cavity is normal in size, normal wall thickness and normal systolic function. 3. Mild mitral regurgitation. 4. No pericardial effusion seen.   seen by pulm  at [facility] chest ct done - pft showed mild defect w bronchodilator response - recommended trelegy    Echo 3/22: normal ef, trace mr/tr  -stress/echo test : exercised 6 minutes to 90% mphr, no ekg changes, normal echo portion  -Holter : 24 hours - SR, avg hr 87, (), 26 pvc, 105 pac, 1 run psvt 5 beats 115 bpm, no af, no pauses    seen by pulm  PFT 22: wnl for age, FEV1 94%, no BD response, TLC 89%, DLCO 74% and within normal   states she was on norvasc/benazepril 10/20 pill in past - did not tolerate - states had allergic reaction - lip swelling  dc'd this 2 years ago  also h/o reaction to verapamil        seen by renal       walks daily    PMH/PSH:  asthma  htn  solitary kidney s/p nephrectomy for renal cell cancer   predm  overweight  left cataract surgery   cva    ALL:  motrin      SH:  no tobacco/etoh/drugs  from   lived [place] 54 years    daughter - 60's - healthy  live alone  retired  worked in [email or web address]        FH:  mother -  MI 91  father -  hepatitis, 45  siblings - (3  - cancer), 5 alive      MEDS:  asa 81 mg qd  crestor 40 mg qhs  hctz 25 mg qd  coreg 6.25 mg bid  breo

## 2024-10-01 NOTE — DISCUSSION/SUMMARY
[Patient] : the patient [___ Month(s)] : in [unfilled] month(s) [EKG obtained to assist in diagnosis and management of assessed problem(s)] : EKG obtained to assist in diagnosis and management of assessed problem(s) [FreeTextEntry1] : 89 y/o female with h/o asthma, htn, cva, predm, solitary kidney s/p nephrectomy 1998 for renal cell cancer who presents for f/up today  -ordered CTA cor for cp  -neuro f/up for cva seen on imaging  -will refer for LTM to EP given CVA's seen on MRI brain  -limited echo w bubble ordered given cva seen on imaging  -MRA head/neck: 9/24: 1. RIGHT CAROTID CIRCULATION:  Intact. 2. LEFT CAROTID CIRCULATION:  Intact. 3. VERTEBRAL CIRCULATION:  Intact 4. BRAIN:   Multiple small remote infarctions within the cerebral hemispheric white matter and basal ganglia. No acute infarction. Ischemic white matter disease upper range typical for age. Diffuse brain volume loss lower range typical for age  -ekg ordered today - SB, normal intervals, no st/t changes  -Nuclear Stress Test 9/23: normal  -Echo 9/23: 1. Left ventricular wall thickness is mildly increased. Left ventricular systolic function is normal. 2. Right ventricular cavity is normal in size, normal wall thickness and normal systolic function. 3. Mild mitral regurgitation. 4. No pericardial effusion seen.  -stress/echo test 2/22: exercised 6 minutes to 90% mphr, no ekg changes, normal echo portion  -Holter 2/22: 24 hours - SR, avg hr 87, (), 26 pvc, 105 pac, 1 run psvt 5 beats 115 bpm, no af, no pauses  -labs 2024 reviewed   -continue hctz, coreg  -continue asa, statin  -Echo 3/22 - normal ef, trace mr/tr  -renal f/up  -counseled on cvd risk factors  -pulm f/up - on breo - pft w mild defect w bronchodilator response  -f/up 6 months for htn    I have spent 30 minutes reviewing labs, records, tests and discussing cvd and bp management.

## 2024-10-11 ENCOUNTER — APPOINTMENT (OUTPATIENT)
Dept: HEART AND VASCULAR | Facility: CLINIC | Age: 89
End: 2024-10-11

## 2024-10-11 PROCEDURE — ZZZZZ: CPT | Mod: NC

## 2024-11-05 ENCOUNTER — OUTPATIENT (OUTPATIENT)
Dept: OUTPATIENT SERVICES | Facility: HOSPITAL | Age: 89
LOS: 1 days | End: 2024-11-05
Payer: MEDICARE

## 2024-11-05 ENCOUNTER — RESULT REVIEW (OUTPATIENT)
Age: 89
End: 2024-11-05

## 2024-11-05 ENCOUNTER — NON-APPOINTMENT (OUTPATIENT)
Age: 89
End: 2024-11-05

## 2024-11-05 DIAGNOSIS — I63.9 CEREBRAL INFARCTION, UNSPECIFIED: ICD-10-CM

## 2024-11-05 DIAGNOSIS — I10 ESSENTIAL (PRIMARY) HYPERTENSION: ICD-10-CM

## 2024-11-05 PROCEDURE — 93308 TTE F-UP OR LMTD: CPT | Mod: 26

## 2024-11-05 PROCEDURE — 93321 DOPPLER ECHO F-UP/LMTD STD: CPT

## 2024-11-08 ENCOUNTER — APPOINTMENT (OUTPATIENT)
Dept: HEART AND VASCULAR | Facility: CLINIC | Age: 89
End: 2024-11-08

## 2024-11-08 VITALS
SYSTOLIC BLOOD PRESSURE: 138 MMHG | HEIGHT: 63 IN | HEART RATE: 51 BPM | OXYGEN SATURATION: 98 % | WEIGHT: 162 LBS | DIASTOLIC BLOOD PRESSURE: 83 MMHG | TEMPERATURE: 97.2 F | BODY MASS INDEX: 28.7 KG/M2

## 2024-11-08 PROCEDURE — 93000 ELECTROCARDIOGRAM COMPLETE: CPT

## 2024-11-08 PROCEDURE — 99204 OFFICE O/P NEW MOD 45 MIN: CPT | Mod: 25

## 2024-11-22 ENCOUNTER — APPOINTMENT (OUTPATIENT)
Dept: NEUROLOGY | Facility: CLINIC | Age: 89
End: 2024-11-22

## 2024-11-25 ENCOUNTER — APPOINTMENT (OUTPATIENT)
Dept: HEART AND VASCULAR | Facility: CLINIC | Age: 89
End: 2024-11-25

## 2024-12-18 ENCOUNTER — APPOINTMENT (OUTPATIENT)
Dept: NEPHROLOGY | Facility: CLINIC | Age: 88
End: 2024-12-18

## 2025-01-09 ENCOUNTER — APPOINTMENT (OUTPATIENT)
Dept: NEUROLOGY | Age: 89
End: 2025-01-09

## 2025-01-13 ENCOUNTER — APPOINTMENT (OUTPATIENT)
Dept: HEART AND VASCULAR | Facility: CLINIC | Age: 89
End: 2025-01-13

## 2025-01-22 ENCOUNTER — NON-APPOINTMENT (OUTPATIENT)
Age: 89
End: 2025-01-22

## 2025-01-22 ENCOUNTER — APPOINTMENT (OUTPATIENT)
Dept: NEPHROLOGY | Facility: CLINIC | Age: 89
End: 2025-01-22
Payer: MEDICARE

## 2025-01-22 VITALS — DIASTOLIC BLOOD PRESSURE: 80 MMHG | SYSTOLIC BLOOD PRESSURE: 148 MMHG

## 2025-01-22 DIAGNOSIS — N18.31 CHRONIC KIDNEY DISEASE, STAGE 3A: ICD-10-CM

## 2025-01-22 DIAGNOSIS — I10 ESSENTIAL (PRIMARY) HYPERTENSION: ICD-10-CM

## 2025-01-22 PROCEDURE — G2211 COMPLEX E/M VISIT ADD ON: CPT

## 2025-01-22 PROCEDURE — 99204 OFFICE O/P NEW MOD 45 MIN: CPT

## 2025-01-23 ENCOUNTER — NON-APPOINTMENT (OUTPATIENT)
Age: 89
End: 2025-01-23

## 2025-01-23 LAB
ALBUMIN SERPL ELPH-MCNC: 3.9 G/DL
ALP BLD-CCNC: 69 U/L
ALT SERPL-CCNC: 15 U/L
ANION GAP SERPL CALC-SCNC: 13 MMOL/L
APPEARANCE: CLEAR
AST SERPL-CCNC: 26 U/L
BASOPHILS # BLD AUTO: 0.02 K/UL
BASOPHILS NFR BLD AUTO: 0.5 %
BILIRUB SERPL-MCNC: 0.9 MG/DL
BILIRUBIN URINE: NEGATIVE
BLOOD URINE: NEGATIVE
BUN SERPL-MCNC: 23 MG/DL
CALCIUM SERPL-MCNC: 9.7 MG/DL
CHLORIDE SERPL-SCNC: 103 MMOL/L
CO2 SERPL-SCNC: 24 MMOL/L
COLOR: YELLOW
CREAT SERPL-MCNC: 0.98 MG/DL
CREAT SPEC-SCNC: 66 MG/DL
CREAT SPEC-SCNC: 66 MG/DL
CREAT/PROT UR: 0.2 RATIO
CYSTATIN C SERPL-MCNC: 1.23 MG/L
EGFR: 55 ML/MIN/1.73M2
EOSINOPHIL # BLD AUTO: 0.09 K/UL
EOSINOPHIL NFR BLD AUTO: 2.3 %
GFR/BSA.PRED SERPLBLD CYS-BASED-ARV: 49 ML/MIN/1.73M2
GLUCOSE QUALITATIVE U: NEGATIVE MG/DL
GLUCOSE SERPL-MCNC: 101 MG/DL
HCT VFR BLD CALC: 38 %
HGB BLD-MCNC: 12.4 G/DL
IMM GRANULOCYTES NFR BLD AUTO: 0.3 %
KETONES URINE: NEGATIVE MG/DL
LEUKOCYTE ESTERASE URINE: NEGATIVE
LYMPHOCYTES # BLD AUTO: 0.95 K/UL
LYMPHOCYTES NFR BLD AUTO: 23.8 %
MAN DIFF?: NORMAL
MCHC RBC-ENTMCNC: 27.8 PG
MCHC RBC-ENTMCNC: 32.6 G/DL
MCV RBC AUTO: 85.2 FL
MICROALBUMIN 24H UR DL<=1MG/L-MCNC: 2.9 MG/DL
MICROALBUMIN/CREAT 24H UR-RTO: 44 MG/G
MONOCYTES # BLD AUTO: 0.42 K/UL
MONOCYTES NFR BLD AUTO: 10.5 %
NEUTROPHILS # BLD AUTO: 2.5 K/UL
NEUTROPHILS NFR BLD AUTO: 62.6 %
NITRITE URINE: NEGATIVE
PH URINE: 6.5
PHOSPHATE SERPL-MCNC: 4.6 MG/DL
PLATELET # BLD AUTO: 186 K/UL
POTASSIUM SERPL-SCNC: 4.7 MMOL/L
PROT SERPL-MCNC: 6.5 G/DL
PROT UR-MCNC: 10 MG/DL
PROTEIN URINE: NEGATIVE MG/DL
RBC # BLD: 4.46 M/UL
RBC # FLD: 15 %
SODIUM SERPL-SCNC: 140 MMOL/L
SPECIFIC GRAVITY URINE: 1.01
UROBILINOGEN URINE: 0.2 MG/DL
WBC # FLD AUTO: 3.99 K/UL

## 2025-01-24 ENCOUNTER — APPOINTMENT (OUTPATIENT)
Dept: HEART AND VASCULAR | Facility: CLINIC | Age: 89
End: 2025-01-24

## 2025-01-24 ENCOUNTER — NON-APPOINTMENT (OUTPATIENT)
Age: 89
End: 2025-01-24

## 2025-01-24 VITALS
HEART RATE: 58 BPM | TEMPERATURE: 97.2 F | HEIGHT: 63 IN | DIASTOLIC BLOOD PRESSURE: 90 MMHG | BODY MASS INDEX: 29.41 KG/M2 | SYSTOLIC BLOOD PRESSURE: 142 MMHG | WEIGHT: 166 LBS | OXYGEN SATURATION: 98 %

## 2025-01-24 DIAGNOSIS — I63.9 CEREBRAL INFARCTION, UNSPECIFIED: ICD-10-CM

## 2025-01-24 PROCEDURE — 93000 ELECTROCARDIOGRAM COMPLETE: CPT | Mod: 59

## 2025-01-24 PROCEDURE — 99214 OFFICE O/P EST MOD 30 MIN: CPT | Mod: 25

## 2025-02-12 VITALS
DIASTOLIC BLOOD PRESSURE: 90 MMHG | TEMPERATURE: 98 F | WEIGHT: 164.02 LBS | SYSTOLIC BLOOD PRESSURE: 134 MMHG | HEIGHT: 64 IN | RESPIRATION RATE: 18 BRPM | HEART RATE: 53 BPM | OXYGEN SATURATION: 99 %

## 2025-02-12 RX ORDER — ASPIRIN 81 MG/1
81 TABLET, COATED ORAL DAILY
Refills: 0 | Status: DISCONTINUED | OUTPATIENT
Start: 2025-02-13 | End: 2025-02-14

## 2025-02-12 RX ORDER — ANTISEPTIC SURGICAL SCRUB 0.04 MG/ML
1 SOLUTION TOPICAL ONCE
Refills: 0 | Status: DISCONTINUED | OUTPATIENT
Start: 2025-02-13 | End: 2025-02-14

## 2025-02-12 NOTE — H&P ADULT - ASSESSMENT
89 yo F, FHx of CAD (Mother MI)  with a history of asthma, HTN, pre-DM, multiple silent CVA (no residual deficits) seen incidentally on brain MRI (9/2024), renal cell carcinoma s/p nephrectomy 1998 presents for  cardiac cath with possible PCI if clinically indicated due to multiple  risk fx, CCS III anginal symptoms and abnormal NST.    -H/H: 13/38.4. Pt denies BRBPR, hematuria, hematochezia, melena. Pt loaded 81 mg ASA x1 and Plavix 600mg x1  -Cr: 087. EF normal. Euvolemic on exam.   bolus x one given per protocol followed by NS @ 75 cc/hour x two hours   -Renal Clearance in [facility]  **EP to place ILR post procedure; call them and let them know when patient is out.   -Cardiac Catheterization ordered placed   -Home Medication Confirmed via:  verbal confirmation  -Type of sedation: Moderate  -Candidate for sedation: Yes  -Risks & benefits of procedure and alternative therapy have been explained to the patient including but not limited to: allergic reaction, bleeding w/possible need for blood transfusion, infection, renal and vascular compromise, limb damage, arrhythmia, stroke, vessel dissection/perforation, Myocardial infarction, emergent CABG. Informed consent obtained and in chart.

## 2025-02-13 ENCOUNTER — INPATIENT (INPATIENT)
Facility: HOSPITAL | Age: 89
LOS: 0 days | Discharge: HOME | End: 2025-02-14
Attending: STUDENT IN AN ORGANIZED HEALTH CARE EDUCATION/TRAINING PROGRAM | Admitting: INTERNAL MEDICINE
Payer: MEDICARE

## 2025-02-13 ENCOUNTER — TRANSCRIPTION ENCOUNTER (OUTPATIENT)
Age: 89
End: 2025-02-13

## 2025-02-13 DIAGNOSIS — Z98.890 OTHER SPECIFIED POSTPROCEDURAL STATES: Chronic | ICD-10-CM

## 2025-02-13 LAB
A1C WITH ESTIMATED AVERAGE GLUCOSE RESULT: 6.2 % — HIGH (ref 4–5.6)
ALBUMIN SERPL ELPH-MCNC: 3.9 G/DL — SIGNIFICANT CHANGE UP (ref 3.3–5)
ALP SERPL-CCNC: 66 U/L — SIGNIFICANT CHANGE UP (ref 40–120)
ALT FLD-CCNC: 11 U/L — SIGNIFICANT CHANGE UP (ref 10–45)
ANION GAP SERPL CALC-SCNC: 8 MMOL/L — SIGNIFICANT CHANGE UP (ref 5–17)
APTT BLD: 31.9 SEC — SIGNIFICANT CHANGE UP (ref 24.5–35.6)
AST SERPL-CCNC: 17 U/L — SIGNIFICANT CHANGE UP (ref 10–40)
BASOPHILS # BLD AUTO: 0.04 K/UL — SIGNIFICANT CHANGE UP (ref 0–0.2)
BASOPHILS NFR BLD AUTO: 1 % — SIGNIFICANT CHANGE UP (ref 0–2)
BILIRUB SERPL-MCNC: 1.1 MG/DL — SIGNIFICANT CHANGE UP (ref 0.2–1.2)
BUN SERPL-MCNC: 16 MG/DL — SIGNIFICANT CHANGE UP (ref 7–23)
CALCIUM SERPL-MCNC: 9.4 MG/DL — SIGNIFICANT CHANGE UP (ref 8.4–10.5)
CHLORIDE SERPL-SCNC: 101 MMOL/L — SIGNIFICANT CHANGE UP (ref 96–108)
CHOLEST SERPL-MCNC: 139 MG/DL — SIGNIFICANT CHANGE UP
CK MB CFR SERPL CALC: 2.7 NG/ML — SIGNIFICANT CHANGE UP (ref 0–6.7)
CK SERPL-CCNC: 151 U/L — SIGNIFICANT CHANGE UP (ref 25–170)
CO2 SERPL-SCNC: 28 MMOL/L — SIGNIFICANT CHANGE UP (ref 22–31)
CREAT SERPL-MCNC: 0.87 MG/DL — SIGNIFICANT CHANGE UP (ref 0.5–1.3)
EGFR: 64 ML/MIN/1.73M2 — SIGNIFICANT CHANGE UP
EOSINOPHIL # BLD AUTO: 0.13 K/UL — SIGNIFICANT CHANGE UP (ref 0–0.5)
EOSINOPHIL NFR BLD AUTO: 3.1 % — SIGNIFICANT CHANGE UP (ref 0–6)
ESTIMATED AVERAGE GLUCOSE: 131 MG/DL — HIGH (ref 68–114)
GLUCOSE SERPL-MCNC: 92 MG/DL — SIGNIFICANT CHANGE UP (ref 70–99)
HCT VFR BLD CALC: 38.9 % — SIGNIFICANT CHANGE UP (ref 34.5–45)
HDLC SERPL-MCNC: 72 MG/DL — SIGNIFICANT CHANGE UP
HGB BLD-MCNC: 13 G/DL — SIGNIFICANT CHANGE UP (ref 11.5–15.5)
IMM GRANULOCYTES NFR BLD AUTO: 0.2 % — SIGNIFICANT CHANGE UP (ref 0–0.9)
INR BLD: 1.04 — SIGNIFICANT CHANGE UP (ref 0.85–1.16)
LIPID PNL WITH DIRECT LDL SERPL: 50 MG/DL — SIGNIFICANT CHANGE UP
LYMPHOCYTES # BLD AUTO: 1.26 K/UL — SIGNIFICANT CHANGE UP (ref 1–3.3)
LYMPHOCYTES # BLD AUTO: 30.3 % — SIGNIFICANT CHANGE UP (ref 13–44)
MAGNESIUM SERPL-MCNC: 1.9 MG/DL — SIGNIFICANT CHANGE UP (ref 1.6–2.6)
MCHC RBC-ENTMCNC: 28.6 PG — SIGNIFICANT CHANGE UP (ref 27–34)
MCHC RBC-ENTMCNC: 33.4 G/DL — SIGNIFICANT CHANGE UP (ref 32–36)
MCV RBC AUTO: 85.7 FL — SIGNIFICANT CHANGE UP (ref 80–100)
MONOCYTES # BLD AUTO: 0.46 K/UL — SIGNIFICANT CHANGE UP (ref 0–0.9)
MONOCYTES NFR BLD AUTO: 11.1 % — SIGNIFICANT CHANGE UP (ref 2–14)
NEUTROPHILS # BLD AUTO: 2.26 K/UL — SIGNIFICANT CHANGE UP (ref 1.8–7.4)
NEUTROPHILS NFR BLD AUTO: 54.3 % — SIGNIFICANT CHANGE UP (ref 43–77)
NON HDL CHOLESTEROL: 67 MG/DL — SIGNIFICANT CHANGE UP
NRBC BLD AUTO-RTO: 0 /100 WBCS — SIGNIFICANT CHANGE UP (ref 0–0)
PLATELET # BLD AUTO: 193 K/UL — SIGNIFICANT CHANGE UP (ref 150–400)
POTASSIUM SERPL-MCNC: 4.2 MMOL/L — SIGNIFICANT CHANGE UP (ref 3.5–5.3)
POTASSIUM SERPL-SCNC: 4.2 MMOL/L — SIGNIFICANT CHANGE UP (ref 3.5–5.3)
PROT SERPL-MCNC: 7.3 G/DL — SIGNIFICANT CHANGE UP (ref 6–8.3)
PROTHROM AB SERPL-ACNC: 12 SEC — SIGNIFICANT CHANGE UP (ref 9.9–13.4)
RBC # BLD: 4.54 M/UL — SIGNIFICANT CHANGE UP (ref 3.8–5.2)
RBC # FLD: 14.6 % — HIGH (ref 10.3–14.5)
SODIUM SERPL-SCNC: 137 MMOL/L — SIGNIFICANT CHANGE UP (ref 135–145)
TRIGL SERPL-MCNC: 88 MG/DL — SIGNIFICANT CHANGE UP
WBC # BLD: 4.16 K/UL — SIGNIFICANT CHANGE UP (ref 3.8–10.5)
WBC # FLD AUTO: 4.16 K/UL — SIGNIFICANT CHANGE UP (ref 3.8–10.5)

## 2025-02-13 PROCEDURE — 93010 ELECTROCARDIOGRAM REPORT: CPT

## 2025-02-13 PROCEDURE — 99152 MOD SED SAME PHYS/QHP 5/>YRS: CPT

## 2025-02-13 PROCEDURE — 93458 L HRT ARTERY/VENTRICLE ANGIO: CPT | Mod: 26

## 2025-02-13 RX ORDER — CARVEDILOL 6.25 MG
1 TABLET ORAL
Refills: 0 | DISCHARGE

## 2025-02-13 RX ORDER — CARVEDILOL 6.25 MG
6.25 TABLET ORAL EVERY 12 HOURS
Refills: 0 | Status: DISCONTINUED | OUTPATIENT
Start: 2025-02-13 | End: 2025-02-14

## 2025-02-13 RX ORDER — HYDROCHLOROTHIAZIDE 50 MG
1 TABLET ORAL
Refills: 0 | DISCHARGE

## 2025-02-13 RX ORDER — ROSUVASTATIN CALCIUM 10 MG/1
1 TABLET, FILM COATED ORAL
Refills: 0 | DISCHARGE

## 2025-02-13 RX ORDER — BACTERIOSTATIC SODIUM CHLORIDE 0.9 %
1000 VIAL (ML) INJECTION
Refills: 0 | Status: DISCONTINUED | OUTPATIENT
Start: 2025-02-13 | End: 2025-02-13

## 2025-02-13 RX ORDER — ASPIRIN 81 MG/1
1 TABLET, COATED ORAL
Refills: 0 | DISCHARGE

## 2025-02-13 RX ORDER — ROSUVASTATIN CALCIUM 10 MG/1
40 TABLET, FILM COATED ORAL AT BEDTIME
Refills: 0 | Status: DISCONTINUED | OUTPATIENT
Start: 2025-02-13 | End: 2025-02-14

## 2025-02-13 RX ORDER — BACTERIOSTATIC SODIUM CHLORIDE 0.9 %
250 VIAL (ML) INJECTION ONCE
Refills: 0 | Status: COMPLETED | OUTPATIENT
Start: 2025-02-13 | End: 2025-02-13

## 2025-02-13 RX ORDER — BACTERIOSTATIC SODIUM CHLORIDE 0.9 %
1000 VIAL (ML) INJECTION
Refills: 0 | Status: COMPLETED | OUTPATIENT
Start: 2025-02-13 | End: 2025-02-13

## 2025-02-13 RX ADMIN — Medication 75 MILLILITER(S): at 13:15

## 2025-02-13 RX ADMIN — Medication 1000 MILLILITER(S): at 13:15

## 2025-02-13 RX ADMIN — Medication 600 MILLIGRAM(S): at 14:00

## 2025-02-13 RX ADMIN — Medication 140 MILLILITER(S): at 19:17

## 2025-02-13 RX ADMIN — ASPIRIN 81 MILLIGRAM(S): 81 TABLET, COATED ORAL at 14:00

## 2025-02-13 RX ADMIN — ROSUVASTATIN CALCIUM 40 MILLIGRAM(S): 10 TABLET, FILM COATED ORAL at 20:54

## 2025-02-13 RX ADMIN — Medication 6.25 MILLIGRAM(S): at 19:18

## 2025-02-13 NOTE — DISCHARGE NOTE PROVIDER - HOSPITAL COURSE
****INCOMPLETE****  88 yo F, FHx of CAD (Mother MI)  with a history of asthma, HTN, pre-DM, multiple silent CVA (no residual deficits) seen incidentally on brain MRI (9/2024), renal cell carcinoma s/p nephrectomy 1998 who initially presented to neurologist with the complaint of  3 days of acute dizziness (Neuro: Stephaneomar Win in 8/2024) and underwent  MRI Brain (8/2024) revealing Multiple small remote infarctions within the cerebral hemispheric white matter and basal ganglia. No acute infarction. Ischemic white matter disease upper range typical for age. Diffuse brain volume loss lower range typical for age.     Patient was referred to EP for potential ILR placement due to MRI Brain findings and endorsed she has been experiencing chest pain since July, has had similar symptoms in past few years. Pt states the chest pressure lasted five years with radiation to back and can occur independent of activity.  Denies SOB, palpitations, edema, orthopnea, PND or other symptoms.      Per MD note, pt stated that she had NST revealing moderate size of mild reduced uptake in the lateral/inferolateral wall that is reversible.     Previous cardiac testing:   Limited Echo 11/5/24: Injection of agitated saline via a peripheral vein reveals no evidence of a right-to-left shunt.  NST 9/2023: Normal  Echo 9/2023: LVSF normal, LV wall thickness mildly increased, RV cavity normal in size, mild MR.     In light of multiple risk fx, CCS III anginal symptoms and abnormal NST, pt s/p LHC 2/13/25 reveals mild luminal irregularities. EDP 16. RRA stable.       EP was consulted, plan for ILR placement.     Pt admitted overnight for observation and telemetry monitoring. Pt seen and examined at bedside this AM without any complaints or events overnight, VSS, labs and telemetry reviewed and pt stable for discharge as discussed with Dr. Vega. Pt has received appropriate discharge instructions, including medication regimen, access site management and follow up with Dr. Malone in 1-2 weeks.    Pt discharge copies detail cardiovascular history, medication testing/treatments OR has created a patient portal account and instructed to provider their records at their 1st appointment.    CVD (GLP-1 receptor agonist, SGLT2 inhibitor) meds discussed w/ patients and encouraged to discuss further with PMD or endo at next visit.   89 yo F, FHx of CAD (Mother MI)  with a history of asthma, HTN, pre-DM, multiple silent CVA (no residual deficits) seen incidentally on brain MRI (9/2024), renal cell carcinoma s/p nephrectomy 1998 who initially presented to neurologist with the complaint of  3 days of acute dizziness (Neuro: Stephane Kyree Win in 8/2024) and underwent  MRI Brain (8/2024) revealing Multiple small remote infarctions within the cerebral hemispheric white matter and basal ganglia. No acute infarction. Ischemic white matter disease upper range typical for age. Diffuse brain volume loss lower range typical for age.     Patient was referred to EP for ILR placement due to MRI Brain findings and endorsed she has been experiencing chest pain since July, has had similar symptoms in past few years. Pt states the chest pressure lasted five years with radiation to back and can occur independent of activity.  Denies SOB, palpitations, edema, orthopnea, PND or other symptoms.      Per MD note, pt stated that she had NST revealing moderate size of mild reduced uptake in the lateral/inferolateral wall that is reversible.     Previous cardiac testing:   Limited Echo 11/5/24: Injection of agitated saline via a peripheral vein reveals no evidence of a right-to-left shunt.  NST 9/2023: Normal  Echo 9/2023: LVSF normal, LV wall thickness mildly increased, RV cavity normal in size, mild MR.     In light of multiple risk fx, CCS III anginal symptoms and abnormal NST, pt s/p LHC 2/13/25 reveals mild luminal irregularities. EDP 16. RRA stable.     EP was consulted and an implantable loop recorder was placed on 2/14.     Pt seen and examined at bedside this AM without any complaints or events overnight, VSS, labs and telemetry reviewed and pt stable for discharge as discussed with Dr. Vega. Pt has received appropriate discharge instructions, including medication regimen, access site management and follow up with Dr. Malone in on 3/3 at 2pm.    Pt discharge copies detail cardiovascular history, medication testing/treatments OR has created a patient portal account and instructed to provider their records at their 1st appointment.    CVD (GLP-1 receptor agonist, SGLT2 inhibitor) meds discussed w/ patients and encouraged to discuss further with PMD or endo at next visit.   89 yo F, FHx of CAD (Mother MI)  with a history of asthma, HTN, pre-DM, multiple silent CVA (no residual deficits) seen incidentally on brain MRI (9/2024), renal cell carcinoma s/p nephrectomy 1998 who initially presented to neurologist with the complaint of  3 days of acute dizziness (Neuro: Stephane Kyree Win in 8/2024) and underwent  MRI Brain (8/2024) revealing Multiple small remote infarctions within the cerebral hemispheric white matter and basal ganglia. No acute infarction. Ischemic white matter disease upper range typical for age. Diffuse brain volume loss lower range typical for age.     Patient was referred to EP for ILR placement due to MRI Brain findings and endorsed she has been experiencing chest pain since July, has had similar symptoms in past few years. Pt states the chest pressure lasted five years with radiation to back and can occur independent of activity.  Denies SOB, palpitations, edema, orthopnea, PND or other symptoms.      Per MD note, pt stated that she had NST revealing moderate size of mild reduced uptake in the lateral/inferolateral wall that is reversible.     Previous cardiac testing:   Limited Echo 11/5/24: Injection of agitated saline via a peripheral vein reveals no evidence of a right-to-left shunt.  NST 9/2023: Normal  Echo 9/2023: LVSF normal, LV wall thickness mildly increased, RV cavity normal in size, mild MR.     In light of multiple risk fx, CCS III anginal symptoms and abnormal NST, pt s/p C 2/13/25 reveals mild luminal irregularities. EDP 16. RRA stable.     Pt seen and examined at bedside this AM without any complaints or events overnight, VSS, labs and telemetry reviewed and pt stable for discharge as discussed with Dr. Vega. Pt has received appropriate discharge instructions, including medication regimen, access site management and follow up with Dr. Malone in on 3/3 at 2pm.    Pt discharge copies detail cardiovascular history, medication testing/treatments OR has created a patient portal account and instructed to provider their records at their 1st appointment.    CVD (GLP-1 receptor agonist, SGLT2 inhibitor) meds discussed w/ patients and encouraged to discuss further with PMD or endo at next visit.

## 2025-02-13 NOTE — CONSULT NOTE ADULT - SUBJECTIVE AND OBJECTIVE BOX
EPS Consult Note    HPI:  88 yo F, FHx of CAD (Mother MI)  with a history of asthma, HTN, pre-DM, multiple silent CVA (no residual deficits) seen incidentally on brain MRI (9/2024), renal cell carcinoma s/p nephrectomy 1998 who initially presented to neurologist with the complaint of  3 days of acute dizziness (Neuro: Stephane Kyree Win in 8/2024) and underwent  MRI Brain (8/2024) revealing Multiple small remote infarctions within the cerebral hemispheric white matter and basal ganglia. No acute infarction. Ischemic white matter disease upper range typical for age. Diffuse brain volume loss lower range typical for age.     Patient was referred to EP for potential ILR placement due to MRI Brain findings and endorsed she has been experiencing chest pain since July, has had similar symptoms in past few years. Pt states the chest pressure lasted five years with radiation to back and can occur independent of activity.  Denies SOB, palpitations, edema, orthopnea, PND or other symptoms.      Per MD note, pt stated that she had NST revealing moderate size of mild reduced uptake in the lateral/inferolateral wall that is reversible.     She is s/p cardiac cath 2/13/25: mild luminal irregularities. EDP: 16.   EP to insert ILR tomorrow 2/14/25 for further surveillance of arrythmia to evaluate if CVA is cardioembolic in nature.    PAST MEDICAL & SURGICAL HISTORY:  Hypertension  Dyslipidemia  CVA (cerebrovascular accident)  S/P knee surgery    Family history of early CAD    Social History:no smoking, no drugs, no algohol    pertinent home medications:    Inpatient Medications:   aspirin enteric coated 81 milliGRAM(s) Oral daily  carvedilol 6.25 milliGRAM(s) Oral every 12 hours  chlorhexidine 4% Liquid 1 Application(s) Topical once  rosuvastatin 40 milliGRAM(s) Oral at bedtime  sodium chloride 0.9%. 1000 milliLiter(s) IV Continuous <Continuous>    Allergies: Motrin (Unknown)  Norvasc (Rash; Urticaria)      ROS:   CONSTITUTIONAL: No fever, weight loss + fatigue  EYES: Pt denies  RESPIRATORY: No cough, wheezing, chills or hemoptysis; No Shortness of Breath  CARDIOVASCULAR: see HPI  GASTROINTESTINAL: Pt denies  NEUROLOGICAL: Pt denies  SKIN: Pt denies   PSYCHIATRIC: Pt denies  HEME/LYMPH: Pt denies    Appearance: No acute distress, well developed  Eyes: normal appearing conjunctiva, pupils and eyelids  Cardiovascular: Normal S1 S2, No JVD, No murmurs, No edema  Respiratory: Lungs clear to auscultation	bilaterally.  No wheeze, rhonchi, rales noted  Gastrointestinal:  Soft, NT/ND 	  Neurologic:  No deficit noted  Psych: A&Ox3, normal mood/affect  Musculoskeletal: normal gait  Skin: no rash noted, normal color and pigmentation.        LABS:                        13.0   4.16  )-----------( 193      ( 13 Feb 2025 11:41 )             38.9     02-13    137  |  101  |  16  ----------------------------<  92  4.2   |  28  |  0.87    Ca    9.4      13 Feb 2025 11:41  Mg     1.9     02-13    TPro  7.3  /  Alb  3.9  /  TBili  1.1  /  DBili  x   /  AST  17  /  ALT  11  /  AlkPhos  66  02-13    PT/INR - ( 13 Feb 2025 11:41 )   PT: 12.0 sec;   INR: 1.04          PTT - ( 13 Feb 2025 11:41 )  PTT:31.9 sec

## 2025-02-13 NOTE — DISCHARGE NOTE PROVIDER - NSDCMRMEDTOKEN_GEN_ALL_CORE_FT
aspirin 81 mg oral capsule: 1 cap(s) orally once a day  carvedilol 6.25 mg oral tablet: 1 tab(s) orally 2 times a day  hydroCHLOROthiazide 25 mg oral tablet: 1 tab(s) orally once a day  rosuvastatin 40 mg oral capsule: 1 cap(s) orally once a day

## 2025-02-13 NOTE — DISCHARGE NOTE PROVIDER - NSDCFUSCHEDAPPT_GEN_ALL_CORE_FT
Sharan Romero  [facility] Physician [facility]  HEARTVASC [address]  Scheduled Appointment: 03/21/2025     [facility]  HEARTVASC [address]  Scheduled Appointment: 03/03/2025    Sharan Romero  [facility]  HEARTVASC [address]  Scheduled Appointment: 03/21/2025    Julian Carrillo  [facility]  NEPHRO [address]  Scheduled Appointment: 04/11/2025     [facility]  HEARTVASC [address]  Scheduled Appointment: 03/03/2025    Josefa Haley  [facility]  HEARTVASC [address]  Scheduled Appointment: 03/05/2025    Sharan Romero  [facility]  HEARTVASC [address]  Scheduled Appointment: 03/21/2025    Julian Carrillo  [facility]  NEPHRO [address]  Scheduled Appointment: 04/11/2025

## 2025-02-13 NOTE — DISCHARGE NOTE PROVIDER - PROVIDER TOKENS
PROVIDER:[TOKEN:[[id number]],FOLLOWUP:[1 week]] PROVIDER:[TOKEN:[[id number]],SCHEDULEDAPPT:[03/03/2025],SCHEDULEDAPPTTIME:[02:00 PM],ESTABLISHEDPATIENT:[T]]

## 2025-02-13 NOTE — DISCHARGE NOTE PROVIDER - NSDCACTIVITY_GEN_ALL_CORE
No heavy lifting/straining Showering allowed/Stairs allowed/Walking - Indoors allowed/No heavy lifting/straining/Walking - Outdoors allowed/Activity as tolerated

## 2025-02-13 NOTE — PATIENT PROFILE ADULT - NSPROPTRIGHTBILLOFRIGHTS_GEN_A_NUR
Clinic Care Coordination Contact  Care Team Conversations    Patient is needing a refill of his Metformin as he has changed this to 1000 mg twice daily, medication pended.    Patient is also scheduled for hospital follow-up but not until May 5 any possibility of moving his hospital follow-up appointment sooner with Dr. Lin.      Thank you,      Georgina BECERRA, RN, PHN, [facility]  Primary Clinic Care Coordination    [facility]  Primary Care Clinics  [email or web address]   Office: [phone]  Employed by [employer]           patient

## 2025-02-13 NOTE — DISCHARGE NOTE PROVIDER - ATTENDING DISCHARGE PHYSICAL EXAMINATION:
Ms. Villa is an 89F with history of HTN, CVA asymptomatic, RCC s/p nephrectomy presented for dizziness, exertional  symptoms with abmormal NST and admitted for [facility]. S/p LIR    Exam:   NAD  JVP normal   Lungs CTA  WWP, no edema  RRA access site stable, pulse intact    CAD- nonobstructive GDMT, asa, statin  Dizziness- ILR pending  CVA- ILR pending, asa/statin as above  HLD- statin

## 2025-02-13 NOTE — DISCHARGE NOTE PROVIDER - NSDCCPCAREPLAN_GEN_ALL_CORE_FT
PRINCIPAL DISCHARGE DIAGNOSIS  Diagnosis: CAD (coronary artery disease)  Assessment and Plan of Treatment: You underwent a cardiac catheterization on 2/13/25 that revealed   Avoid strenuous activity or heavy lifting anything more than 5lbs for the next five days. Do not take a bath or swim for the next five days; you may shower. For any bleeding or hematoma formation (hardened blood collection under the skin) at the access site of your right wrist please hold pressure and go to the emergency room. Please follow up with Dr. Malone in 1-2 weeks. For recurrent chest pain, please call your doctor or go to the emergency room.  Please continue with aspirin 81mg daily.      SECONDARY DISCHARGE DIAGNOSES  Diagnosis: Hyperlipidemia  Assessment and Plan of Treatment: Please continue crestor 40mg daily at bedtime to lower your cholesterol.    Diagnosis: HTN (hypertension)  Assessment and Plan of Treatment: Please start carvedilol 6.25mg twice daily and HCTZ 25mg daily.     PRINCIPAL DISCHARGE DIAGNOSIS  Diagnosis: CAD (coronary artery disease)  Assessment and Plan of Treatment: You underwent a cardiac catheterization on 2/13/25 that revealed   Avoid strenuous activity or heavy lifting anything more than 5lbs for the next five days. Do not take a bath or swim for the next five days; you may shower. For any bleeding or hematoma formation (hardened blood collection under the skin) at the access site of your right wrist please hold pressure and go to the emergency room. Please follow up with Dr. Malone in 1-2 weeks. For recurrent chest pain, please call your doctor or go to the emergency room.  Please continue with aspirin 81mg daily.      SECONDARY DISCHARGE DIAGNOSES  Diagnosis: Hyperlipidemia  Assessment and Plan of Treatment: Please continue crestor 40mg daily at bedtime to lower your cholesterol. High cholesterol contributes to heart disease.    Diagnosis: HTN (hypertension)  Assessment and Plan of Treatment: Please continue carvedilol 6.25mg twice daily and HCTZ 25mg daily for treating your blood pressure. For blood pressure that is too high or too low please see your doctor or go to the emergency room as necessary.      Diagnosis: Dizziness  Assessment and Plan of Treatment: Please reach out to your cardiologist if you are feeling symptoms. If you feel faint or as if you are passing out, please call 911 and go to the emergency department.  An implantable loop recorder (ILR) was placed on 2/14.   -	Keep the Tegaderm dressing on for 24 hours and then should be removed by the patient.    -	Any bleeding, discharge or issues at procedure site please call [phone].    -	Patient should follow up with EP on 3/5/2025 at 9AM with EP NP Josefa Haley  -	No submerging under water until the incision heals.  The patient may shower 24 hours after implant.  Please don’t rub or pick glue off                and let it fall off on its own.  Avoid ointments or lotions at the incision site.    -	You will be given an ID card and an information booklet.  There will be a phone number to call if you have any questions about remote monitoring please call the             company that makes your ILR.                         o  	[employer] [phone]  -     Please contact EP team for any clinical questions at [phone]

## 2025-02-13 NOTE — CONSULT NOTE ADULT - ASSESSMENT
89 yo F, FHx of CAD (Mother MI)  with a history of asthma, HTN, pre-DM, multiple silent CVA (no residual deficits) seen incidentally on brain MRI (9/2024), renal cell carcinoma s/p nephrectomy 1998 who is s/p [facility] 2/13/25 that reveals mild luminal irregularities throughout. EDP 16mmHg. R TR removed with no complications.  She is admitted to cardiology for ILR placement in the morning of 2/14/25.

## 2025-02-13 NOTE — PATIENT PROFILE ADULT - NSPROEXTENSIONSOFSELF_GEN_A_NUR
Patient in his room and appears sad  He states his wife wont talk to him about their marriage  Patient states they have been having difficulties that started about 6 months ago  Patient reports the wife has been distant and ambivalent towards him and he feels he has tried multiple times to reconnect and he feels she just pushes him away  Patient reports he left the "load room" open to "send a message" but had no inclination to kill/hurt himself  Patient states he has his children and grandchildren that he adores and would never do anything to hurt them  none

## 2025-02-13 NOTE — PROGRESS NOTE ADULT - SUBJECTIVE AND OBJECTIVE BOX
Interventional Cardiology  Post  Diagnostic Catheterization  Discharge Note      Patient without complaints. Ambulated and voided without difficulties    Afebrile, VSS    Ext:    		  		Right Radial: no hematoma, bleeding, dressing; C/D/I      Pulses:    intact RAD to baseline     A/P: 88 yo F, FHx of CAD (Mother MI)  with a history of asthma, HTN, pre-DM, multiple silent CVA (no residual deficits) seen incidentally on brain MRI (9/2024), renal cell carcinoma s/p nephrectomy 1998 who is s/p [facility] 2/13/25 that reveals mild luminal irregularities throughout. EDP 16mmHg. R TR removed with no complications.    1.	Follow-up with PMD/Cardiologist Dr. Malone in 1week.   2.          Pt given instructions on importance of post radial access site care. .    3. 	Stable for discharge as per attending Dr. Gallagher after bed rest, pt voids, wrist stable and 30 minutes of ambulation.

## 2025-02-13 NOTE — PATIENT PROFILE ADULT - FALL HARM RISK - HARM RISK INTERVENTIONS

## 2025-02-13 NOTE — DISCHARGE NOTE PROVIDER - CARE PROVIDER_API CALL
Gina Malone  Cardiology  [address], [address]  [place], [place] [address]  Phone: [phone]  Fax: [phone]  Follow Up Time: 1 week   Gina Malone  Cardiology  [address], [address]  [place], [place] [address]  Phone: [phone]  Fax: [phone]  Established Patient  Scheduled Appointment: 03/03/2025 02:00 PM

## 2025-02-14 ENCOUNTER — TRANSCRIPTION ENCOUNTER (OUTPATIENT)
Age: 89
End: 2025-02-14

## 2025-02-14 VITALS
HEART RATE: 68 BPM | RESPIRATION RATE: 15 BRPM | OXYGEN SATURATION: 97 % | TEMPERATURE: 98 F | DIASTOLIC BLOOD PRESSURE: 81 MMHG | SYSTOLIC BLOOD PRESSURE: 147 MMHG

## 2025-02-14 PROBLEM — I10 ESSENTIAL (PRIMARY) HYPERTENSION: Chronic | Status: ACTIVE | Noted: 2025-02-12

## 2025-02-14 PROBLEM — I63.9 CEREBRAL INFARCTION, UNSPECIFIED: Chronic | Status: ACTIVE | Noted: 2025-02-12

## 2025-02-14 LAB
ANION GAP SERPL CALC-SCNC: 11 MMOL/L — SIGNIFICANT CHANGE UP (ref 5–17)
BUN SERPL-MCNC: 13 MG/DL — SIGNIFICANT CHANGE UP (ref 7–23)
CALCIUM SERPL-MCNC: 9.2 MG/DL — SIGNIFICANT CHANGE UP (ref 8.4–10.5)
CHLORIDE SERPL-SCNC: 102 MMOL/L — SIGNIFICANT CHANGE UP (ref 96–108)
CO2 SERPL-SCNC: 26 MMOL/L — SIGNIFICANT CHANGE UP (ref 22–31)
CREAT SERPL-MCNC: 0.86 MG/DL — SIGNIFICANT CHANGE UP (ref 0.5–1.3)
EGFR: 65 ML/MIN/1.73M2 — SIGNIFICANT CHANGE UP
GLUCOSE SERPL-MCNC: 93 MG/DL — SIGNIFICANT CHANGE UP (ref 70–99)
HCT VFR BLD CALC: 38.5 % — SIGNIFICANT CHANGE UP (ref 34.5–45)
HGB BLD-MCNC: 12.4 G/DL — SIGNIFICANT CHANGE UP (ref 11.5–15.5)
MAGNESIUM SERPL-MCNC: 1.9 MG/DL — SIGNIFICANT CHANGE UP (ref 1.6–2.6)
MCHC RBC-ENTMCNC: 27 PG — SIGNIFICANT CHANGE UP (ref 27–34)
MCHC RBC-ENTMCNC: 32.2 G/DL — SIGNIFICANT CHANGE UP (ref 32–36)
MCV RBC AUTO: 83.9 FL — SIGNIFICANT CHANGE UP (ref 80–100)
NRBC BLD AUTO-RTO: 0 /100 WBCS — SIGNIFICANT CHANGE UP (ref 0–0)
PHOSPHATE SERPL-MCNC: 3.3 MG/DL — SIGNIFICANT CHANGE UP (ref 2.5–4.5)
PLATELET # BLD AUTO: 186 K/UL — SIGNIFICANT CHANGE UP (ref 150–400)
POTASSIUM SERPL-MCNC: 3.9 MMOL/L — SIGNIFICANT CHANGE UP (ref 3.5–5.3)
POTASSIUM SERPL-SCNC: 3.9 MMOL/L — SIGNIFICANT CHANGE UP (ref 3.5–5.3)
RBC # BLD: 4.59 M/UL — SIGNIFICANT CHANGE UP (ref 3.8–5.2)
RBC # FLD: 14.5 % — SIGNIFICANT CHANGE UP (ref 10.3–14.5)
SODIUM SERPL-SCNC: 139 MMOL/L — SIGNIFICANT CHANGE UP (ref 135–145)
WBC # BLD: 3.58 K/UL — LOW (ref 3.8–10.5)
WBC # FLD AUTO: 3.58 K/UL — LOW (ref 3.8–10.5)

## 2025-02-14 PROCEDURE — 80048 BASIC METABOLIC PNL TOTAL CA: CPT

## 2025-02-14 PROCEDURE — 85610 PROTHROMBIN TIME: CPT

## 2025-02-14 PROCEDURE — 93005 ELECTROCARDIOGRAM TRACING: CPT

## 2025-02-14 PROCEDURE — 82553 CREATINE MB FRACTION: CPT

## 2025-02-14 PROCEDURE — 82550 ASSAY OF CK (CPK): CPT

## 2025-02-14 PROCEDURE — 36415 COLL VENOUS BLD VENIPUNCTURE: CPT

## 2025-02-14 PROCEDURE — C1764: CPT

## 2025-02-14 PROCEDURE — 33285 INSJ SUBQ CAR RHYTHM MNTR: CPT

## 2025-02-14 PROCEDURE — 83036 HEMOGLOBIN GLYCOSYLATED A1C: CPT

## 2025-02-14 PROCEDURE — 85025 COMPLETE CBC W/AUTO DIFF WBC: CPT

## 2025-02-14 PROCEDURE — 85730 THROMBOPLASTIN TIME PARTIAL: CPT

## 2025-02-14 PROCEDURE — 83735 ASSAY OF MAGNESIUM: CPT

## 2025-02-14 PROCEDURE — 85027 COMPLETE CBC AUTOMATED: CPT

## 2025-02-14 PROCEDURE — 80053 COMPREHEN METABOLIC PANEL: CPT

## 2025-02-14 PROCEDURE — C1887: CPT

## 2025-02-14 PROCEDURE — C1894: CPT

## 2025-02-14 PROCEDURE — 84100 ASSAY OF PHOSPHORUS: CPT

## 2025-02-14 PROCEDURE — C1769: CPT

## 2025-02-14 PROCEDURE — 80061 LIPID PANEL: CPT

## 2025-02-14 RX ORDER — MAGNESIUM OXIDE 400 MG
400 TABLET ORAL
Refills: 0 | Status: DISCONTINUED | OUTPATIENT
Start: 2025-02-14 | End: 2025-02-14

## 2025-02-14 RX ORDER — POTASSIUM CHLORIDE 750 MG/1
20 TABLET, EXTENDED RELEASE ORAL ONCE
Refills: 0 | Status: COMPLETED | OUTPATIENT
Start: 2025-02-14 | End: 2025-02-14

## 2025-02-14 RX ORDER — LIDOCAINE HCL/EPINEPHRINE 2 %-1:100K
10 VIAL (ML) INJECTION ONCE
Refills: 0 | Status: DISCONTINUED | OUTPATIENT
Start: 2025-02-14 | End: 2025-02-14

## 2025-02-14 RX ADMIN — Medication 400 MILLIGRAM(S): at 12:19

## 2025-02-14 RX ADMIN — Medication 6.25 MILLIGRAM(S): at 05:07

## 2025-02-14 RX ADMIN — POTASSIUM CHLORIDE 20 MILLIEQUIVALENT(S): 750 TABLET, EXTENDED RELEASE ORAL at 12:20

## 2025-02-14 RX ADMIN — ASPIRIN 81 MILLIGRAM(S): 81 TABLET, COATED ORAL at 12:19

## 2025-02-14 NOTE — DISCHARGE NOTE NURSING/CASE MANAGEMENT/SOCIAL WORK - FINANCIAL ASSISTANCE
[facility] provides services at a reduced cost to those who are determined to be eligible through [facility]’s financial assistance program. Information regarding [facility]’s financial assistance program can be found by going to [email or web address] or by calling [phone].

## 2025-02-14 NOTE — PROGRESS NOTE ADULT - SUBJECTIVE AND OBJECTIVE BOX
Please see EP consult note for clinical assessment    Primary ICD-10 CM                                I63.9 – Cerebral infarction	                 Procedure- ILR implant  Location- Bedside, [facility]    The rationale for insertion of an implantable loop recorder was discussed with the patient in detail.  The risks of infection, bleeding, pocket hematoma and pain were discussed.  Vendor presence for technical support was also discussed.   Alternatives were reviewed and all questions were answered.  The patient agrees to proceed, and informed consent was signed and placed in the chart.  The patient was in a non-fasting state.    Cloroprep was used to clean procedure site (L chest) and patient was draped in a sterile manner.  The procedure was performed under local anesthetic only.  The site was infiltrated with 1% Lidocaine with 0.001% Epi.  A small incision was made with a specialized scalpel in the region of the fourth intercostal space along the left sternal border.  Using the ILR insertion tool the ILR was “injected” in the subcutaneous plane and deployed (please see CCW report for model number) in the region of optimal R-wave sensing.  Hemostasis was achieved and the wound was closed in a running fashion using 4-0 Vicral suture.  Skin was closed using Dermabond + Steri Strips.  Tegaderm was placed over incision.    Complications- None.  The patient can be discharged post implant.      Plan of Care-   -	Patient to keep Tegaderm on for 24 hours and then should be removed by the patient.    -	Any bleeding, discharge or issues at procedure site please call [phone].    -	Patient should follow up with EP on 4/28/ 1950   -	.  No submerging under water until the incision heals.  The patient may shower 24 hours after implant.  Please don’t rub or pick glue off                and let it fall off on its own.  Avoid ointments or lotions at the incision site.    -	You will be given an ID card and an information booklet.  There will be a phone number to call if you have any questions about remote monitoring please call the             company that makes your ILR.                         o  	[employer] [phone]    -      For patient -  Any clinical questions please call [phone]    -      For providers - Any questions in the hospital call [phone]    Please see EP consult note for clinical assessment    Primary ICD-10 CM                                I63.9 – Cerebral infarction	                 Procedure- ILR implant  Location- Bedside, [facility]    The rationale for insertion of an implantable loop recorder was discussed with the patient in detail.  The risks of infection, bleeding, pocket hematoma and pain were discussed.  Vendor presence for technical support was also discussed.   Alternatives were reviewed and all questions were answered.  The patient agrees to proceed, and informed consent was signed and placed in the chart.  The patient was in a non-fasting state.    Cloroprep was used to clean procedure site (L chest) and patient was draped in a sterile manner.  The procedure was performed under local anesthetic only.  The site was infiltrated with 1% Lidocaine with 0.001% Epi.  A small incision was made with a specialized scalpel in the region of the fourth intercostal space along the left sternal border.  Using the ILR insertion tool the ILR was “injected” in the subcutaneous plane and deployed (please see CCW report for model number) in the region of optimal R-wave sensing.  Hemostasis was achieved and the wound was closed in a running fashion using 4-0 Vicral suture.  Skin was closed using Dermabond + Steri Strips.  Tegaderm was placed over incision.    Complications- None.  The patient can be discharged post implant.      Plan of Care-   -	Patient to keep Tegaderm on for 24 hours and then should be removed by the patient.    -	Any bleeding, discharge or issues at procedure site please call [phone].    -	Patient should follow up with EP on 3/5/2025 at 9AM  -	.  No submerging under water until the incision heals.  The patient may shower 24 hours after implant.  Please don’t rub or pick glue off                and let it fall off on its own.  Avoid ointments or lotions at the incision site.    -	You will be given an ID card and an information booklet.  There will be a phone number to call if you have any questions about remote monitoring please call the             company that makes your ILR.                         o  	[employer] [phone]    -      For patient -  Any clinical questions please call [phone]    -      For providers - Any questions in the hospital call [phone]

## 2025-02-14 NOTE — DISCHARGE NOTE NURSING/CASE MANAGEMENT/SOCIAL WORK - PATIENT PORTAL LINK FT
You can access the FollowMyHealth Patient Portal offered by [facility] by registering at the following website: [email or web address]. By joining [email or web address]’s FollowMyHealth portal, you will also be able to view your health information using other applications (apps) compatible with our system.

## 2025-02-19 PROBLEM — E78.5 HYPERLIPIDEMIA, UNSPECIFIED: Chronic | Status: ACTIVE | Noted: 2025-02-12

## 2025-02-20 DIAGNOSIS — Z88.6 ALLERGY STATUS TO ANALGESIC AGENT: ICD-10-CM

## 2025-02-20 DIAGNOSIS — I25.10 ATHEROSCLEROTIC HEART DISEASE OF NATIVE CORONARY ARTERY WITHOUT ANGINA PECTORIS: ICD-10-CM

## 2025-02-20 DIAGNOSIS — Z79.82 LONG TERM (CURRENT) USE OF ASPIRIN: ICD-10-CM

## 2025-02-20 DIAGNOSIS — R73.03 PREDIABETES: ICD-10-CM

## 2025-02-20 DIAGNOSIS — E78.5 HYPERLIPIDEMIA, UNSPECIFIED: ICD-10-CM

## 2025-02-20 DIAGNOSIS — Z90.5 ACQUIRED ABSENCE OF KIDNEY: ICD-10-CM

## 2025-02-20 DIAGNOSIS — Z82.49 FAMILY HISTORY OF ISCHEMIC HEART DISEASE AND OTHER DISEASES OF THE CIRCULATORY SYSTEM: ICD-10-CM

## 2025-02-20 DIAGNOSIS — I10 ESSENTIAL (PRIMARY) HYPERTENSION: ICD-10-CM

## 2025-02-20 DIAGNOSIS — Z86.73 PERSONAL HISTORY OF TRANSIENT ISCHEMIC ATTACK (TIA), AND CEREBRAL INFARCTION WITHOUT RESIDUAL DEFICITS: ICD-10-CM

## 2025-02-20 DIAGNOSIS — J45.909 UNSPECIFIED ASTHMA, UNCOMPLICATED: ICD-10-CM

## 2025-02-20 DIAGNOSIS — Z85.53 PERSONAL HISTORY OF MALIGNANT NEOPLASM OF RENAL PELVIS: ICD-10-CM

## 2025-02-20 DIAGNOSIS — R42 DIZZINESS AND GIDDINESS: ICD-10-CM

## 2025-03-03 ENCOUNTER — APPOINTMENT (OUTPATIENT)
Dept: HEART AND VASCULAR | Facility: CLINIC | Age: 89
End: 2025-03-03
Payer: MEDICARE

## 2025-03-03 VITALS
BODY MASS INDEX: 29.23 KG/M2 | SYSTOLIC BLOOD PRESSURE: 148 MMHG | TEMPERATURE: 96.1 F | HEIGHT: 63 IN | DIASTOLIC BLOOD PRESSURE: 83 MMHG | WEIGHT: 165 LBS | OXYGEN SATURATION: 95 % | HEART RATE: 53 BPM

## 2025-03-03 PROCEDURE — G2211 COMPLEX E/M VISIT ADD ON: CPT

## 2025-03-03 PROCEDURE — 99214 OFFICE O/P EST MOD 30 MIN: CPT

## 2025-03-03 RX ORDER — SPIRONOLACTONE 25 MG/1
25 TABLET ORAL DAILY
Qty: 45 | Refills: 1 | Status: ACTIVE | COMMUNITY
Start: 2025-03-03 | End: 1900-01-01

## 2025-03-05 ENCOUNTER — APPOINTMENT (OUTPATIENT)
Dept: HEART AND VASCULAR | Facility: CLINIC | Age: 89
End: 2025-03-05
Payer: MEDICARE

## 2025-03-05 VITALS
WEIGHT: 165 LBS | HEART RATE: 67 BPM | HEIGHT: 63 IN | DIASTOLIC BLOOD PRESSURE: 87 MMHG | BODY MASS INDEX: 29.23 KG/M2 | OXYGEN SATURATION: 97 % | TEMPERATURE: 97.7 F | SYSTOLIC BLOOD PRESSURE: 139 MMHG

## 2025-03-05 DIAGNOSIS — Z95.818 PRESENCE OF OTHER CARDIAC IMPLANTS AND GRAFTS: ICD-10-CM

## 2025-03-05 PROCEDURE — 93285 PRGRMG DEV EVAL SCRMS IP: CPT

## 2025-03-25 ENCOUNTER — APPOINTMENT (OUTPATIENT)
Dept: HEART AND VASCULAR | Facility: CLINIC | Age: 89
End: 2025-03-25
Payer: MEDICARE

## 2025-03-25 VITALS — DIASTOLIC BLOOD PRESSURE: 68 MMHG | SYSTOLIC BLOOD PRESSURE: 132 MMHG

## 2025-03-25 VITALS
HEIGHT: 63 IN | SYSTOLIC BLOOD PRESSURE: 135 MMHG | WEIGHT: 162 LBS | BODY MASS INDEX: 28.7 KG/M2 | OXYGEN SATURATION: 96 % | HEART RATE: 62 BPM | TEMPERATURE: 97.3 F | DIASTOLIC BLOOD PRESSURE: 88 MMHG

## 2025-03-25 DIAGNOSIS — I10 ESSENTIAL (PRIMARY) HYPERTENSION: ICD-10-CM

## 2025-03-25 PROCEDURE — 36415 COLL VENOUS BLD VENIPUNCTURE: CPT

## 2025-03-25 PROCEDURE — 99212 OFFICE O/P EST SF 10 MIN: CPT | Mod: 25

## 2025-03-26 LAB
ANION GAP SERPL CALC-SCNC: 10 MMOL/L
BUN SERPL-MCNC: 29 MG/DL
CALCIUM SERPL-MCNC: 10.1 MG/DL
CHLORIDE SERPL-SCNC: 105 MMOL/L
CO2 SERPL-SCNC: 27 MMOL/L
CREAT SERPL-MCNC: 0.99 MG/DL
EGFRCR SERPLBLD CKD-EPI 2021: 54 ML/MIN/1.73M2
GLUCOSE SERPL-MCNC: 91 MG/DL
POTASSIUM SERPL-SCNC: 4.9 MMOL/L
SODIUM SERPL-SCNC: 143 MMOL/L

## 2025-04-01 DIAGNOSIS — I48.91 UNSPECIFIED ATRIAL FIBRILLATION: ICD-10-CM

## 2025-04-01 RX ORDER — APIXABAN 5 MG/1
5 TABLET, FILM COATED ORAL
Qty: 60 | Refills: 4 | Status: ACTIVE | COMMUNITY
Start: 2025-04-01 | End: 1900-01-01

## 2025-04-11 ENCOUNTER — APPOINTMENT (OUTPATIENT)
Dept: NEPHROLOGY | Facility: CLINIC | Age: 89
End: 2025-04-11
Payer: MEDICARE

## 2025-04-11 ENCOUNTER — APPOINTMENT (OUTPATIENT)
Dept: HEART AND VASCULAR | Facility: CLINIC | Age: 89
End: 2025-04-11
Payer: MEDICARE

## 2025-04-11 VITALS
HEIGHT: 63 IN | BODY MASS INDEX: 29.23 KG/M2 | WEIGHT: 165 LBS | HEART RATE: 51 BPM | OXYGEN SATURATION: 96 % | SYSTOLIC BLOOD PRESSURE: 150 MMHG | DIASTOLIC BLOOD PRESSURE: 77 MMHG

## 2025-04-11 VITALS — SYSTOLIC BLOOD PRESSURE: 122 MMHG | DIASTOLIC BLOOD PRESSURE: 75 MMHG

## 2025-04-11 DIAGNOSIS — N18.31 CHRONIC KIDNEY DISEASE, STAGE 3A: ICD-10-CM

## 2025-04-11 DIAGNOSIS — I48.91 UNSPECIFIED ATRIAL FIBRILLATION: ICD-10-CM

## 2025-04-11 PROCEDURE — G2211 COMPLEX E/M VISIT ADD ON: CPT

## 2025-04-11 PROCEDURE — 93291 INTERROG DEV EVAL SCRMS IP: CPT

## 2025-04-11 PROCEDURE — 99213 OFFICE O/P EST LOW 20 MIN: CPT

## 2025-04-16 ENCOUNTER — APPOINTMENT (OUTPATIENT)
Dept: HEART AND VASCULAR | Facility: CLINIC | Age: 89
End: 2025-04-16
Payer: MEDICARE

## 2025-04-16 VITALS — DIASTOLIC BLOOD PRESSURE: 60 MMHG | SYSTOLIC BLOOD PRESSURE: 100 MMHG

## 2025-04-16 VITALS
OXYGEN SATURATION: 94 % | HEART RATE: 62 BPM | SYSTOLIC BLOOD PRESSURE: 99 MMHG | DIASTOLIC BLOOD PRESSURE: 61 MMHG | TEMPERATURE: 97.9 F | HEIGHT: 63 IN | BODY MASS INDEX: 28.7 KG/M2 | WEIGHT: 162 LBS

## 2025-04-16 DIAGNOSIS — I10 ESSENTIAL (PRIMARY) HYPERTENSION: ICD-10-CM

## 2025-04-16 PROCEDURE — 99212 OFFICE O/P EST SF 10 MIN: CPT

## 2025-05-08 ENCOUNTER — NON-APPOINTMENT (OUTPATIENT)
Age: 89
End: 2025-05-08

## 2025-05-08 ENCOUNTER — APPOINTMENT (OUTPATIENT)
Dept: HEART AND VASCULAR | Facility: CLINIC | Age: 89
End: 2025-05-08
Payer: MEDICARE

## 2025-05-08 VITALS
TEMPERATURE: 97.8 F | HEIGHT: 63 IN | DIASTOLIC BLOOD PRESSURE: 80 MMHG | SYSTOLIC BLOOD PRESSURE: 135 MMHG | BODY MASS INDEX: 29.41 KG/M2 | WEIGHT: 166 LBS | OXYGEN SATURATION: 95 % | HEART RATE: 55 BPM

## 2025-05-08 VITALS — SYSTOLIC BLOOD PRESSURE: 124 MMHG | DIASTOLIC BLOOD PRESSURE: 78 MMHG

## 2025-05-08 DIAGNOSIS — I10 ESSENTIAL (PRIMARY) HYPERTENSION: ICD-10-CM

## 2025-05-08 PROCEDURE — 99212 OFFICE O/P EST SF 10 MIN: CPT

## 2025-05-16 ENCOUNTER — NON-APPOINTMENT (OUTPATIENT)
Age: 89
End: 2025-05-16

## 2025-05-16 ENCOUNTER — APPOINTMENT (OUTPATIENT)
Dept: HEART AND VASCULAR | Facility: CLINIC | Age: 89
End: 2025-05-16
Payer: MEDICARE

## 2025-05-16 PROCEDURE — 93298 REM INTERROG DEV EVAL SCRMS: CPT

## 2025-06-20 ENCOUNTER — NON-APPOINTMENT (OUTPATIENT)
Age: 89
End: 2025-06-20

## 2025-06-20 ENCOUNTER — APPOINTMENT (OUTPATIENT)
Dept: HEART AND VASCULAR | Facility: CLINIC | Age: 89
End: 2025-06-20
Payer: MEDICARE

## 2025-06-20 PROCEDURE — 93298 REM INTERROG DEV EVAL SCRMS: CPT

## 2025-07-22 ENCOUNTER — RX RENEWAL (OUTPATIENT)
Age: 89
End: 2025-07-22

## 2025-07-25 ENCOUNTER — NON-APPOINTMENT (OUTPATIENT)
Age: 89
End: 2025-07-25

## 2025-07-25 ENCOUNTER — APPOINTMENT (OUTPATIENT)
Dept: HEART AND VASCULAR | Facility: CLINIC | Age: 89
End: 2025-07-25
Payer: MEDICARE

## 2025-07-25 PROCEDURE — 93298 REM INTERROG DEV EVAL SCRMS: CPT

## 2025-08-05 ENCOUNTER — APPOINTMENT (OUTPATIENT)
Dept: HEART AND VASCULAR | Facility: CLINIC | Age: 89
End: 2025-08-05
Payer: MEDICARE

## 2025-08-05 VITALS
SYSTOLIC BLOOD PRESSURE: 127 MMHG | HEIGHT: 63 IN | HEART RATE: 57 BPM | WEIGHT: 166 LBS | DIASTOLIC BLOOD PRESSURE: 79 MMHG | TEMPERATURE: 97.8 F | BODY MASS INDEX: 29.41 KG/M2 | OXYGEN SATURATION: 95 %

## 2025-08-05 PROCEDURE — 99214 OFFICE O/P EST MOD 30 MIN: CPT | Mod: 25

## 2025-08-05 PROCEDURE — 93000 ELECTROCARDIOGRAM COMPLETE: CPT

## 2025-08-05 PROCEDURE — 36415 COLL VENOUS BLD VENIPUNCTURE: CPT

## 2025-08-06 LAB
CHOLEST SERPL-MCNC: 186 MG/DL
ESTIMATED AVERAGE GLUCOSE: 128 MG/DL
HBA1C MFR BLD HPLC: 6.1 %
HDLC SERPL-MCNC: 69 MG/DL
LDLC SERPL-MCNC: 99 MG/DL
NONHDLC SERPL-MCNC: 117 MG/DL
TRIGL SERPL-MCNC: 104 MG/DL

## 2025-08-07 LAB — APO LP(A) SERPL-MCNC: 139.5 NMOL/L

## 2025-08-22 ENCOUNTER — APPOINTMENT (OUTPATIENT)
Dept: HEART AND VASCULAR | Facility: CLINIC | Age: 89
End: 2025-08-22
Payer: MEDICARE

## 2025-08-22 DIAGNOSIS — I34.0 NONRHEUMATIC MITRAL (VALVE) INSUFFICIENCY: ICD-10-CM

## 2025-08-22 DIAGNOSIS — I48.91 UNSPECIFIED ATRIAL FIBRILLATION: ICD-10-CM

## 2025-08-22 PROCEDURE — 93306 TTE W/DOPPLER COMPLETE: CPT

## 2025-08-25 PROBLEM — I34.0 MILD MITRAL INSUFFICIENCY: Status: ACTIVE | Noted: 2025-08-25

## 2025-08-27 ENCOUNTER — NON-APPOINTMENT (OUTPATIENT)
Age: 89
End: 2025-08-27

## 2025-08-27 ENCOUNTER — APPOINTMENT (OUTPATIENT)
Dept: HEART AND VASCULAR | Facility: CLINIC | Age: 89
End: 2025-08-27
Payer: MEDICARE

## 2025-08-27 PROCEDURE — 93298 REM INTERROG DEV EVAL SCRMS: CPT
